# Patient Record
Sex: FEMALE | Race: OTHER | HISPANIC OR LATINO
[De-identification: names, ages, dates, MRNs, and addresses within clinical notes are randomized per-mention and may not be internally consistent; named-entity substitution may affect disease eponyms.]

---

## 2017-04-17 ENCOUNTER — APPOINTMENT (OUTPATIENT)
Dept: NEUROLOGY | Facility: CLINIC | Age: 62
End: 2017-04-17
Payer: COMMERCIAL

## 2017-04-17 VITALS — HEIGHT: 63 IN | BODY MASS INDEX: 32.25 KG/M2 | WEIGHT: 182 LBS

## 2017-04-17 DIAGNOSIS — G45.9 TRANSIENT CEREBRAL ISCHEMIC ATTACK, UNSPECIFIED: ICD-10-CM

## 2017-04-17 DIAGNOSIS — R51 HEADACHE: ICD-10-CM

## 2017-04-17 PROCEDURE — 99205 OFFICE O/P NEW HI 60 MIN: CPT

## 2017-05-17 ENCOUNTER — FORM ENCOUNTER (OUTPATIENT)
Age: 62
End: 2017-05-17

## 2017-05-18 ENCOUNTER — APPOINTMENT (OUTPATIENT)
Dept: MRI IMAGING | Facility: IMAGING CENTER | Age: 62
End: 2017-05-18

## 2017-05-18 ENCOUNTER — OUTPATIENT (OUTPATIENT)
Dept: OUTPATIENT SERVICES | Facility: HOSPITAL | Age: 62
LOS: 1 days | End: 2017-05-18
Payer: MEDICAID

## 2017-05-18 DIAGNOSIS — R51 HEADACHE: ICD-10-CM

## 2017-05-18 DIAGNOSIS — G45.9 TRANSIENT CEREBRAL ISCHEMIC ATTACK, UNSPECIFIED: ICD-10-CM

## 2017-05-18 PROCEDURE — 70544 MR ANGIOGRAPHY HEAD W/O DYE: CPT

## 2017-05-18 PROCEDURE — 82565 ASSAY OF CREATININE: CPT

## 2017-05-18 PROCEDURE — 70553 MRI BRAIN STEM W/O & W/DYE: CPT

## 2018-06-20 ENCOUNTER — APPOINTMENT (OUTPATIENT)
Dept: OTOLARYNGOLOGY | Facility: CLINIC | Age: 63
End: 2018-06-20
Payer: MEDICAID

## 2018-06-20 ENCOUNTER — OUTPATIENT (OUTPATIENT)
Dept: OUTPATIENT SERVICES | Facility: HOSPITAL | Age: 63
LOS: 1 days | Discharge: HOME | End: 2018-06-20

## 2018-06-20 VITALS — HEIGHT: 63.5 IN | BODY MASS INDEX: 31.5 KG/M2 | WEIGHT: 180 LBS

## 2018-06-20 DIAGNOSIS — E07.9 DISORDER OF THYROID, UNSPECIFIED: ICD-10-CM

## 2018-06-20 PROCEDURE — 92511 NASOPHARYNGOSCOPY: CPT

## 2018-06-20 PROCEDURE — 99204 OFFICE O/P NEW MOD 45 MIN: CPT | Mod: 25

## 2018-07-31 DIAGNOSIS — J38.3 OTHER DISEASES OF VOCAL CORDS: ICD-10-CM

## 2018-07-31 DIAGNOSIS — J34.89 OTHER SPECIFIED DISORDERS OF NOSE AND NASAL SINUSES: ICD-10-CM

## 2018-07-31 DIAGNOSIS — K21.9 GASTRO-ESOPHAGEAL REFLUX DISEASE WITHOUT ESOPHAGITIS: ICD-10-CM

## 2018-07-31 DIAGNOSIS — G47.30 SLEEP APNEA, UNSPECIFIED: ICD-10-CM

## 2018-07-31 DIAGNOSIS — J32.9 CHRONIC SINUSITIS, UNSPECIFIED: ICD-10-CM

## 2018-07-31 DIAGNOSIS — J38.4 EDEMA OF LARYNX: ICD-10-CM

## 2018-07-31 DIAGNOSIS — E07.9 DISORDER OF THYROID, UNSPECIFIED: ICD-10-CM

## 2018-09-26 ENCOUNTER — APPOINTMENT (OUTPATIENT)
Dept: OTOLARYNGOLOGY | Facility: CLINIC | Age: 63
End: 2018-09-26
Payer: MEDICAID

## 2018-09-26 DIAGNOSIS — J38.4 EDEMA OF LARYNX: ICD-10-CM

## 2018-09-26 PROCEDURE — 31231 NASAL ENDOSCOPY DX: CPT

## 2018-09-26 PROCEDURE — 99214 OFFICE O/P EST MOD 30 MIN: CPT | Mod: 25

## 2018-09-27 PROBLEM — J38.4 LARYNGEAL EDEMA: Status: ACTIVE | Noted: 2018-07-28

## 2019-02-22 ENCOUNTER — APPOINTMENT (OUTPATIENT)
Dept: OTOLARYNGOLOGY | Facility: HOSPITAL | Age: 64
End: 2019-02-22

## 2019-02-27 ENCOUNTER — APPOINTMENT (OUTPATIENT)
Dept: OTOLARYNGOLOGY | Facility: CLINIC | Age: 64
End: 2019-02-27
Payer: MEDICAID

## 2019-02-27 ENCOUNTER — OUTPATIENT (OUTPATIENT)
Dept: OUTPATIENT SERVICES | Facility: HOSPITAL | Age: 64
LOS: 1 days | Discharge: HOME | End: 2019-02-27

## 2019-02-27 VITALS
WEIGHT: 180 LBS | DIASTOLIC BLOOD PRESSURE: 87 MMHG | HEIGHT: 63.5 IN | BODY MASS INDEX: 31.5 KG/M2 | SYSTOLIC BLOOD PRESSURE: 152 MMHG | HEART RATE: 96 BPM

## 2019-02-27 PROCEDURE — 31231 NASAL ENDOSCOPY DX: CPT

## 2019-02-27 PROCEDURE — 99214 OFFICE O/P EST MOD 30 MIN: CPT | Mod: 25

## 2019-02-27 NOTE — REASON FOR VISIT
[Subsequent Evaluation] : a subsequent evaluation for [Other: _____] : [unfilled] [FreeTextEntry2] : follow up visit for nasal obstruction and LPR. Nasal congestion. \par

## 2019-02-27 NOTE — HISTORY OF PRESENT ILLNESS
[de-identified] : has not yet had PSG\par had bad sinus infection about two weeks ago, given abx and steroid\par does have 3-4 sinus infections each year for many years\par no recent sinus CT\par ears have recovered after infection\par still congested, snoring\par h/o rhinoplasty about 40 years ago, c/o chronic L>R nasal obstruction\par

## 2019-02-27 NOTE — CONSULT LETTER
[Dear  ___] : Dear  [unfilled], [Consult Letter:] : I had the pleasure of evaluating your patient, [unfilled]. [Please see my note below.] : Please see my note below. [Consult Closing:] : Thank you very much for allowing me to participate in the care of this patient.  If you have any questions, please do not hesitate to contact me. [Sincerely,] : Sincerely, [FreeTextEntry3] : Bishop Davis MD, PhD\par Chief, Division of Laryngology\par Department of Otolaryngology\par [facility]\par Pediatric Otolaryngology, [facility]\par  of Otolaryngology\par [facility] [employer] [facility]\par \par \par

## 2019-02-27 NOTE — PHYSICAL EXAM
[Nasal Endoscopy Performed] : nasal endoscopy was performed, see procedure section for findings [] : septum deviated bilaterally [Midline] : trachea located in midline position [Normal] : no rashes [de-identified] : hyponasal voice

## 2019-03-02 ENCOUNTER — OUTPATIENT (OUTPATIENT)
Dept: OUTPATIENT SERVICES | Facility: HOSPITAL | Age: 64
LOS: 1 days | End: 2019-03-02
Payer: MEDICAID

## 2019-03-02 ENCOUNTER — APPOINTMENT (OUTPATIENT)
Dept: CT IMAGING | Facility: IMAGING CENTER | Age: 64
End: 2019-03-02
Payer: MEDICAID

## 2019-03-02 DIAGNOSIS — G47.30 SLEEP APNEA, UNSPECIFIED: ICD-10-CM

## 2019-03-02 PROCEDURE — 70486 CT MAXILLOFACIAL W/O DYE: CPT | Mod: 26

## 2019-03-02 PROCEDURE — 70486 CT MAXILLOFACIAL W/O DYE: CPT

## 2019-03-04 DIAGNOSIS — G47.30 SLEEP APNEA, UNSPECIFIED: ICD-10-CM

## 2019-03-04 DIAGNOSIS — J32.9 CHRONIC SINUSITIS, UNSPECIFIED: ICD-10-CM

## 2019-03-04 DIAGNOSIS — J34.89 OTHER SPECIFIED DISORDERS OF NOSE AND NASAL SINUSES: ICD-10-CM

## 2019-03-04 DIAGNOSIS — J38.3 OTHER DISEASES OF VOCAL CORDS: ICD-10-CM

## 2019-03-04 DIAGNOSIS — K21.9 GASTRO-ESOPHAGEAL REFLUX DISEASE WITHOUT ESOPHAGITIS: ICD-10-CM

## 2019-03-07 ENCOUNTER — OUTPATIENT (OUTPATIENT)
Dept: OUTPATIENT SERVICES | Facility: HOSPITAL | Age: 64
LOS: 1 days | End: 2019-03-07
Payer: MEDICAID

## 2019-03-07 VITALS
WEIGHT: 182.1 LBS | SYSTOLIC BLOOD PRESSURE: 150 MMHG | OXYGEN SATURATION: 98 % | HEART RATE: 90 BPM | DIASTOLIC BLOOD PRESSURE: 96 MMHG | HEIGHT: 61.5 IN | TEMPERATURE: 99 F

## 2019-03-07 DIAGNOSIS — J34.89 OTHER SPECIFIED DISORDERS OF NOSE AND NASAL SINUSES: ICD-10-CM

## 2019-03-07 DIAGNOSIS — E66.9 OBESITY, UNSPECIFIED: ICD-10-CM

## 2019-03-07 DIAGNOSIS — Z90.49 ACQUIRED ABSENCE OF OTHER SPECIFIED PARTS OF DIGESTIVE TRACT: Chronic | ICD-10-CM

## 2019-03-07 DIAGNOSIS — Z86.79 PERSONAL HISTORY OF OTHER DISEASES OF THE CIRCULATORY SYSTEM: ICD-10-CM

## 2019-03-07 DIAGNOSIS — J34.2 DEVIATED NASAL SEPTUM: ICD-10-CM

## 2019-03-07 LAB
ALBUMIN SERPL ELPH-MCNC: 4.2 G/DL — SIGNIFICANT CHANGE UP (ref 3.3–5)
ALP SERPL-CCNC: 115 U/L — SIGNIFICANT CHANGE UP (ref 40–120)
ALT FLD-CCNC: 52 U/L — HIGH (ref 4–33)
ANION GAP SERPL CALC-SCNC: 13 MMO/L — SIGNIFICANT CHANGE UP (ref 7–14)
AST SERPL-CCNC: 27 U/L — SIGNIFICANT CHANGE UP (ref 4–32)
BILIRUB SERPL-MCNC: 0.3 MG/DL — SIGNIFICANT CHANGE UP (ref 0.2–1.2)
BUN SERPL-MCNC: 20 MG/DL — SIGNIFICANT CHANGE UP (ref 7–23)
CALCIUM SERPL-MCNC: 9.2 MG/DL — SIGNIFICANT CHANGE UP (ref 8.4–10.5)
CHLORIDE SERPL-SCNC: 99 MMOL/L — SIGNIFICANT CHANGE UP (ref 98–107)
CO2 SERPL-SCNC: 24 MMOL/L — SIGNIFICANT CHANGE UP (ref 22–31)
CREAT SERPL-MCNC: 0.7 MG/DL — SIGNIFICANT CHANGE UP (ref 0.5–1.3)
GLUCOSE SERPL-MCNC: 88 MG/DL — SIGNIFICANT CHANGE UP (ref 70–99)
HCT VFR BLD CALC: 46.3 % — HIGH (ref 34.5–45)
HGB BLD-MCNC: 15 G/DL — SIGNIFICANT CHANGE UP (ref 11.5–15.5)
MCHC RBC-ENTMCNC: 29.8 PG — SIGNIFICANT CHANGE UP (ref 27–34)
MCHC RBC-ENTMCNC: 32.4 % — SIGNIFICANT CHANGE UP (ref 32–36)
MCV RBC AUTO: 91.9 FL — SIGNIFICANT CHANGE UP (ref 80–100)
NRBC # FLD: 0 K/UL — LOW (ref 25–125)
PLATELET # BLD AUTO: 167 K/UL — SIGNIFICANT CHANGE UP (ref 150–400)
PMV BLD: 11.9 FL — SIGNIFICANT CHANGE UP (ref 7–13)
POTASSIUM SERPL-MCNC: 4.2 MMOL/L — SIGNIFICANT CHANGE UP (ref 3.5–5.3)
POTASSIUM SERPL-SCNC: 4.2 MMOL/L — SIGNIFICANT CHANGE UP (ref 3.5–5.3)
PROT SERPL-MCNC: 7.7 G/DL — SIGNIFICANT CHANGE UP (ref 6–8.3)
RBC # BLD: 5.04 M/UL — SIGNIFICANT CHANGE UP (ref 3.8–5.2)
RBC # FLD: 13.2 % — SIGNIFICANT CHANGE UP (ref 10.3–14.5)
SODIUM SERPL-SCNC: 136 MMOL/L — SIGNIFICANT CHANGE UP (ref 135–145)
WBC # BLD: 6.81 K/UL — SIGNIFICANT CHANGE UP (ref 3.8–10.5)
WBC # FLD AUTO: 6.81 K/UL — SIGNIFICANT CHANGE UP (ref 3.8–10.5)

## 2019-03-07 PROCEDURE — 93010 ELECTROCARDIOGRAM REPORT: CPT

## 2019-03-07 RX ORDER — SODIUM CHLORIDE 9 MG/ML
1000 INJECTION, SOLUTION INTRAVENOUS
Qty: 0 | Refills: 0 | Status: DISCONTINUED | OUTPATIENT
Start: 2019-03-18 | End: 2019-04-02

## 2019-03-07 RX ORDER — SODIUM CHLORIDE 9 MG/ML
3 INJECTION INTRAMUSCULAR; INTRAVENOUS; SUBCUTANEOUS EVERY 8 HOURS
Qty: 0 | Refills: 0 | Status: DISCONTINUED | OUTPATIENT
Start: 2019-03-18 | End: 2019-04-02

## 2019-03-07 NOTE — H&P PST ADULT - PSH
H/O abdominoplasty    H/O breast augmentation    S/P appendectomy  11/2015  S/P partial hysterectomy

## 2019-03-07 NOTE — H&P PST ADULT - NEGATIVE ENMT SYMPTOMS
no hearing difficulty/no nasal congestion/no nasal obstruction/no nose bleeds/no tinnitus/no sinus symptoms/no ear pain/no post-nasal discharge/no throat pain/no dysphagia/no abnormal taste sensation

## 2019-03-07 NOTE — H&P PST ADULT - PROBLEM SELECTOR PLAN 1
Scheduled for Adenoidectomy, septoplasty, bilateral inferior turbinate resection, nasal endoscopy, possible repair vestibular stenosis on 03/18/19. Pre op instructions, famotidine given and explained. Pt verbalized understanding.

## 2019-03-07 NOTE — H&P PST ADULT - NEGATIVE NEUROLOGICAL SYMPTOMS
no loss of sensation/no headache/no facial palsy/no difficulty walking/no paresthesias/no generalized seizures no focal seizures/no hemiparesis/no facial palsy/no paresthesias/no headache/no weakness/no generalized seizures/no loss of sensation/no difficulty walking/no tremors/no vertigo

## 2019-03-07 NOTE — H&P PST ADULT - PMH
Anxiety    Appendicitis    Cervical disc herniation    Dyslipidemia    GERD (gastroesophageal reflux disease)    H/O: HTN (hypertension)    History of transient ischemic attack    Hypothyroid    Lumbar disc disorder    Obesity (BMI 30-39.9) Anxiety    Appendicitis    Cervical disc herniation    Deviated nasal septum    Dyslipidemia    GERD (gastroesophageal reflux disease)    H/O: HTN (hypertension)    History of transient ischemic attack    Hypertrophy of nasal turbinates    Hypothyroid    Lumbar disc disorder    Obesity (BMI 30-39.9)

## 2019-03-07 NOTE — H&P PST ADULT - HISTORY OF PRESENT ILLNESS
61 yr old female with PMH: Hypothyroidism, HTN, TIA (2009), Dyslipidemia, Obesity, Anxiety presents to PST for pre op evaluation with long term h/o recurrent frequent sinus infections. C/O difficulty breathing through the nose x 7 years. 61 yr old female with PMH: Hypothyroidism, HTN, TIA (2009), Dyslipidemia, Obesity, Anxiety presents to PST for pre op evaluation with long term h/o recurrent frequent sinus infections. C/O difficulty breathing through the nose x 7 years. Now scheduled for Adenoidectomy, Septoplasty, bilateral inferior turbinate resection, nasal endoscopy, possible repair on vestibular stenosis on 03/18/19 61 yr old female with PMH: Hypothyroidism, HTN, Hypothyroidism, TIA (2009), Dyslipidemia, Obesity, Anxiety presents to PST for pre op evaluation with long term h/o recurrent frequent sinus infections. C/O difficulty breathing through the nose x 7 years. Now scheduled for Adenoidectomy, Septoplasty, bilateral inferior turbinate resection, nasal endoscopy, possible repair on vestibular stenosis on 03/18/19

## 2019-03-07 NOTE — H&P PST ADULT - NEGATIVE GENERAL GENITOURINARY SYMPTOMS
no flank pain L/no hematuria/no renal colic/no dysuria/no flank pain R/no bladder infections/no urine discoloration/no gas in urine/normal urinary frequency

## 2019-03-07 NOTE — H&P PST ADULT - NSANTHOSAYNRD_GEN_A_CORE
No. FIOR screening performed.  STOP BANG Legend: 0-2 = LOW Risk; 3-4 = INTERMEDIATE Risk; 5-8 = HIGH Risk

## 2019-03-07 NOTE — H&P PST ADULT - NEGATIVE GENERAL SYMPTOMS
no malaise/no fever/no chills/no polydipsia/no weight loss/no weight gain/no polyphagia/no polyuria/no fatigue

## 2019-03-07 NOTE — H&P PST ADULT - NEGATIVE BREAST SYMPTOMS
no breast tenderness L/no breast tenderness R/no nipple discharge L/no breast lump L/no nipple discharge R/no breast lump R

## 2019-03-07 NOTE — H&P PST ADULT - RS GEN PE MLT RESP DETAILS PC
no intercostal retractions/no rhonchi/respirations non-labored/no wheezes/clear to auscultation bilaterally/airway patent/good air movement/no subcutaneous emphysema/breath sounds equal/no rales/no chest wall tenderness

## 2019-03-07 NOTE — H&P PST ADULT - NEGATIVE OPHTHALMOLOGIC SYMPTOMS
no blurred vision L/no pain R/no irritation R/no loss of vision L/no blurred vision R/no discharge R/no pain L/no irritation L/no loss of vision R/no diplopia/no photophobia/no lacrimation R/no discharge L/no lacrimation L

## 2019-03-07 NOTE — H&P PST ADULT - PROBLEM SELECTOR PLAN 3
/96- 150/86 during PST. Pt asymptomatic.  Pt instructed to see PCP for further BP evaluation preop  Pending medical consultation

## 2019-03-15 NOTE — ASU PATIENT PROFILE, ADULT - PMH
Anxiety    Appendicitis    Cervical disc herniation    Deviated nasal septum    Dyslipidemia    GERD (gastroesophageal reflux disease)    H/O: HTN (hypertension)    History of transient ischemic attack    Hypertrophy of nasal turbinates    Hypothyroid    Lumbar disc disorder    Obesity (BMI 30-39.9)

## 2019-03-17 ENCOUNTER — TRANSCRIPTION ENCOUNTER (OUTPATIENT)
Age: 64
End: 2019-03-17

## 2019-03-18 ENCOUNTER — OUTPATIENT (OUTPATIENT)
Dept: OUTPATIENT SERVICES | Facility: HOSPITAL | Age: 64
LOS: 1 days | Discharge: HOME | End: 2019-03-18
Payer: MEDICAID

## 2019-03-18 ENCOUNTER — RESULT REVIEW (OUTPATIENT)
Age: 64
End: 2019-03-18

## 2019-03-18 ENCOUNTER — APPOINTMENT (OUTPATIENT)
Dept: OTOLARYNGOLOGY | Facility: HOSPITAL | Age: 64
End: 2019-03-18

## 2019-03-18 VITALS
OXYGEN SATURATION: 96 % | SYSTOLIC BLOOD PRESSURE: 123 MMHG | HEART RATE: 88 BPM | DIASTOLIC BLOOD PRESSURE: 63 MMHG | RESPIRATION RATE: 15 BRPM

## 2019-03-18 VITALS
RESPIRATION RATE: 16 BRPM | HEART RATE: 93 BPM | SYSTOLIC BLOOD PRESSURE: 165 MMHG | OXYGEN SATURATION: 94 % | HEIGHT: 61.5 IN | WEIGHT: 182.1 LBS | DIASTOLIC BLOOD PRESSURE: 80 MMHG | TEMPERATURE: 98 F

## 2019-03-18 DIAGNOSIS — Z90.49 ACQUIRED ABSENCE OF OTHER SPECIFIED PARTS OF DIGESTIVE TRACT: Chronic | ICD-10-CM

## 2019-03-18 DIAGNOSIS — J34.89 OTHER SPECIFIED DISORDERS OF NOSE AND NASAL SINUSES: ICD-10-CM

## 2019-03-18 PROCEDURE — 88311 DECALCIFY TISSUE: CPT | Mod: 26

## 2019-03-18 PROCEDURE — 30465 REPAIR NASAL STENOSIS: CPT | Mod: LT

## 2019-03-18 PROCEDURE — 30140 RESECT INFERIOR TURBINATE: CPT | Mod: 50

## 2019-03-18 PROCEDURE — 31254 NSL/SINS NDSC W/PRTL ETHMDCT: CPT | Mod: LT

## 2019-03-18 PROCEDURE — 31256 EXPLORATION MAXILLARY SINUS: CPT | Mod: 50

## 2019-03-18 PROCEDURE — 31253 NSL/SINS NDSC TOTAL: CPT | Mod: RT

## 2019-03-18 PROCEDURE — 30520 REPAIR OF NASAL SEPTUM: CPT

## 2019-03-18 PROCEDURE — 88304 TISSUE EXAM BY PATHOLOGIST: CPT | Mod: 26

## 2019-03-18 PROCEDURE — 42831 REMOVAL OF ADENOIDS: CPT

## 2019-03-18 RX ORDER — ONDANSETRON 8 MG/1
4 TABLET, FILM COATED ORAL ONCE
Qty: 0 | Refills: 0 | Status: COMPLETED | OUTPATIENT
Start: 2019-03-18 | End: 2019-03-18

## 2019-03-18 RX ORDER — SODIUM CHLORIDE 9 MG/ML
1000 INJECTION, SOLUTION INTRAVENOUS ONCE
Qty: 0 | Refills: 0 | Status: COMPLETED | OUTPATIENT
Start: 2019-03-18 | End: 2019-03-18

## 2019-03-18 RX ORDER — OXYCODONE AND ACETAMINOPHEN 5; 325 MG/1; MG/1
1 TABLET ORAL EVERY 4 HOURS
Qty: 0 | Refills: 0 | Status: DISCONTINUED | OUTPATIENT
Start: 2019-03-18 | End: 2019-03-18

## 2019-03-18 RX ORDER — FENTANYL CITRATE 50 UG/ML
25 INJECTION INTRAVENOUS
Qty: 0 | Refills: 0 | Status: DISCONTINUED | OUTPATIENT
Start: 2019-03-18 | End: 2019-03-18

## 2019-03-18 RX ORDER — HYDROMORPHONE HYDROCHLORIDE 2 MG/ML
0.5 INJECTION INTRAMUSCULAR; INTRAVENOUS; SUBCUTANEOUS
Qty: 0 | Refills: 0 | Status: DISCONTINUED | OUTPATIENT
Start: 2019-03-18 | End: 2019-03-18

## 2019-03-18 RX ORDER — ASPIRIN/CALCIUM CARB/MAGNESIUM 324 MG
2 TABLET ORAL
Qty: 0 | Refills: 0 | COMMUNITY

## 2019-03-18 RX ADMIN — SODIUM CHLORIDE 3 MILLILITER(S): 9 INJECTION INTRAMUSCULAR; INTRAVENOUS; SUBCUTANEOUS at 14:03

## 2019-03-18 RX ADMIN — HYDROMORPHONE HYDROCHLORIDE 0.5 MILLIGRAM(S): 2 INJECTION INTRAMUSCULAR; INTRAVENOUS; SUBCUTANEOUS at 14:00

## 2019-03-18 RX ADMIN — SODIUM CHLORIDE 2000 MILLILITER(S): 9 INJECTION, SOLUTION INTRAVENOUS at 15:45

## 2019-03-18 RX ADMIN — ONDANSETRON 4 MILLIGRAM(S): 8 TABLET, FILM COATED ORAL at 13:35

## 2019-03-18 RX ADMIN — SODIUM CHLORIDE 30 MILLILITER(S): 9 INJECTION, SOLUTION INTRAVENOUS at 14:16

## 2019-03-18 RX ADMIN — HYDROMORPHONE HYDROCHLORIDE 0.5 MILLIGRAM(S): 2 INJECTION INTRAMUSCULAR; INTRAVENOUS; SUBCUTANEOUS at 14:15

## 2019-03-18 NOTE — ASU DISCHARGE PLAN (ADULT/PEDIATRIC) - ASU DC SPECIAL INSTRUCTIONSFT
Nasal saline to each nostril 2-3 times a day. Nasal saline irrigation 2 times a day morning and evening. Afrin only if bleeding occurs for no more than 2-3 days. Pain medicine as needed. Augmentin x 10 days.

## 2019-03-18 NOTE — ASU DISCHARGE PLAN (ADULT/PEDIATRIC) - CARE PROVIDER_API CALL
Bishop Davis (MD; PhD)  Otolaryngology  [facility]  Dept of Otolaryngology, [address] [place] [address]  Phone: [phone]  Fax: [phone]  Follow Up Time:

## 2019-03-18 NOTE — ASU DISCHARGE PLAN (ADULT/PEDIATRIC) - CALL YOUR DOCTOR IF YOU HAVE ANY OF THE FOLLOWING:
Fever greater than (need to indicate Fahrenheit or Celsius)/Bleeding that does not stop EOMI Bleeding that does not stop/Fever greater than (need to indicate Fahrenheit or Celsius)/Nausea and vomiting that does not stop

## 2019-03-18 NOTE — ASU PREOP CHECKLIST - COMMENTS
Pt took Pepcid with sip water at  AM Pt took Pepcid /atorvastatin/ metoprolol/ synthroid / amlodipine  paroxetine   /with sip water at  AM

## 2019-03-19 PROBLEM — J34.3 HYPERTROPHY OF NASAL TURBINATES: Chronic | Status: ACTIVE | Noted: 2019-03-07

## 2019-03-19 PROBLEM — J34.2 DEVIATED NASAL SEPTUM: Chronic | Status: ACTIVE | Noted: 2019-03-07

## 2019-03-28 ENCOUNTER — APPOINTMENT (OUTPATIENT)
Dept: OTOLARYNGOLOGY | Facility: CLINIC | Age: 64
End: 2019-03-28
Payer: MEDICAID

## 2019-03-28 ENCOUNTER — APPOINTMENT (OUTPATIENT)
Dept: OTOLARYNGOLOGY | Facility: CLINIC | Age: 64
End: 2019-03-28

## 2019-03-28 PROCEDURE — 99024 POSTOP FOLLOW-UP VISIT: CPT

## 2019-04-03 ENCOUNTER — APPOINTMENT (OUTPATIENT)
Dept: OTOLARYNGOLOGY | Facility: CLINIC | Age: 64
End: 2019-04-03
Payer: MEDICAID

## 2019-04-03 PROCEDURE — 99024 POSTOP FOLLOW-UP VISIT: CPT

## 2019-04-03 PROCEDURE — 31237 NSL/SINS NDSC SURG BX POLYPC: CPT | Mod: 50,58

## 2019-05-19 NOTE — HISTORY OF PRESENT ILLNESS
[de-identified] : doing well following ESS, minimal bleeding, no vision change\par breathing through the nose has greatly improved, feels breathing is symmetric\par no clear drainage, no HA, sense of smell is returning, pain has subsided, still mildly lethargic\par sleeping well\par using saline rinses\par will be travelling\par

## 2019-05-19 NOTE — HISTORY OF PRESENT ILLNESS
[de-identified] : minimal bleeding since surgery, has used some saline, can breathe through nose but not clear with splints in place\par no vision changes, pain has subsided mostly\par

## 2019-05-29 ENCOUNTER — APPOINTMENT (OUTPATIENT)
Dept: OTOLARYNGOLOGY | Facility: CLINIC | Age: 64
End: 2019-05-29
Payer: MEDICAID

## 2019-05-29 PROCEDURE — 99214 OFFICE O/P EST MOD 30 MIN: CPT | Mod: 25

## 2019-05-29 PROCEDURE — 31237 NSL/SINS NDSC SURG BX POLYPC: CPT | Mod: 50,58

## 2019-05-29 NOTE — HISTORY OF PRESENT ILLNESS
[de-identified] : 64yo F here for f/u surgery stenosis repair, BITR and ESS. Doing really well. Breathing has significantly improved. Has had some left sided soreness. No sinus pain or pressure since surgery. No sinus infections. Has been occasionally doing NS Irrigations. Patent bilaterally. Can pinpoint one spot on left side where feels pain with palpation.\par

## 2019-05-29 NOTE — REASON FOR VISIT
[Subsequent Evaluation] : a subsequent evaluation for [FreeTextEntry2] : f/u BITR and ESS 03/18/2019

## 2019-05-30 DIAGNOSIS — J33.8 OTHER POLYP OF SINUS: ICD-10-CM

## 2019-05-30 DIAGNOSIS — J32.0 CHRONIC MAXILLARY SINUSITIS: ICD-10-CM

## 2019-07-17 ENCOUNTER — OUTPATIENT (OUTPATIENT)
Dept: OUTPATIENT SERVICES | Facility: HOSPITAL | Age: 64
LOS: 1 days | Discharge: HOME | End: 2019-07-17

## 2019-07-17 ENCOUNTER — APPOINTMENT (OUTPATIENT)
Dept: OTOLARYNGOLOGY | Facility: CLINIC | Age: 64
End: 2019-07-17
Payer: MEDICAID

## 2019-07-17 VITALS
HEART RATE: 112 BPM | DIASTOLIC BLOOD PRESSURE: 91 MMHG | SYSTOLIC BLOOD PRESSURE: 145 MMHG | BODY MASS INDEX: 31.5 KG/M2 | WEIGHT: 180 LBS | HEIGHT: 63.5 IN

## 2019-07-17 DIAGNOSIS — J38.3 OTHER DISEASES OF VOCAL CORDS: ICD-10-CM

## 2019-07-17 DIAGNOSIS — Z90.49 ACQUIRED ABSENCE OF OTHER SPECIFIED PARTS OF DIGESTIVE TRACT: Chronic | ICD-10-CM

## 2019-07-17 DIAGNOSIS — J33.8 OTHER POLYP OF SINUS: ICD-10-CM

## 2019-07-17 DIAGNOSIS — J32.9 CHRONIC RHINITIS: ICD-10-CM

## 2019-07-17 DIAGNOSIS — J31.0 CHRONIC RHINITIS: ICD-10-CM

## 2019-07-17 DIAGNOSIS — G47.30 SLEEP APNEA, UNSPECIFIED: ICD-10-CM

## 2019-07-17 PROCEDURE — 99214 OFFICE O/P EST MOD 30 MIN: CPT | Mod: 25

## 2019-07-17 PROCEDURE — 31579 LARYNGOSCOPY TELESCOPIC: CPT

## 2019-07-17 RX ORDER — RANITIDINE HYDROCHLORIDE 150 MG/1
150 CAPSULE ORAL
Qty: 90 | Refills: 0 | Status: DISCONTINUED | COMMUNITY
Start: 2018-09-26 | End: 2019-07-17

## 2019-07-17 RX ORDER — OMEPRAZOLE 20 MG/1
20 CAPSULE, DELAYED RELEASE ORAL TWICE DAILY
Qty: 60 | Refills: 0 | Status: DISCONTINUED | COMMUNITY
Start: 2018-06-20 | End: 2019-07-17

## 2019-07-17 RX ORDER — OXYCODONE AND ACETAMINOPHEN 5; 325 MG/1; MG/1
5-325 TABLET ORAL
Qty: 20 | Refills: 0 | Status: DISCONTINUED | COMMUNITY
Start: 2019-03-18 | End: 2019-07-17

## 2019-07-17 RX ORDER — PREDNISONE 10 MG/1
10 TABLET ORAL
Qty: 21 | Refills: 0 | Status: DISCONTINUED | COMMUNITY
Start: 2017-04-17 | End: 2019-07-17

## 2019-07-17 NOTE — REASON FOR VISIT
[Subsequent Evaluation] : a subsequent evaluation for [Family Member] : family member [FreeTextEntry2] : follow up for BITR and ESS 03/18/2019

## 2019-07-17 NOTE — PHYSICAL EXAM
[Nasal Endoscopy Performed] : nasal endoscopy was performed, see procedure section for findings [Midline] : trachea located in midline position [Laryngoscopy Performed] : laryngoscopy was performed, see procedure section for findings [Normal] : no rashes [de-identified] : clear voice

## 2019-07-17 NOTE — HISTORY OF PRESENT ILLNESS
[de-identified] : 64 year old female follow up for BITR and ESS 03/18/2019. Breathing has greatly improved. States intermittent nasal congestion,treated with Flonase nasal spray with relief.  Denies sinus pain/pressure, post nasal drip, nasal discharge. Very pleased with nasal breathing. No recent infections. No further obstruciton. Dripping has resolved. No further left sided pain (granulkation tissue removed). Sleep is ok, still snoring. No reflux meds. Some heartburn. Some globus sensation. Diet exacerbates reflux.\par \par \par

## 2019-09-25 ENCOUNTER — APPOINTMENT (OUTPATIENT)
Dept: MRI IMAGING | Facility: IMAGING CENTER | Age: 64
End: 2019-09-25
Payer: MEDICAID

## 2019-09-25 ENCOUNTER — OUTPATIENT (OUTPATIENT)
Dept: OUTPATIENT SERVICES | Facility: HOSPITAL | Age: 64
LOS: 1 days | End: 2019-09-25
Payer: MEDICAID

## 2019-09-25 DIAGNOSIS — Z90.49 ACQUIRED ABSENCE OF OTHER SPECIFIED PARTS OF DIGESTIVE TRACT: Chronic | ICD-10-CM

## 2019-09-25 DIAGNOSIS — Z00.8 ENCOUNTER FOR OTHER GENERAL EXAMINATION: ICD-10-CM

## 2019-09-25 PROCEDURE — 70551 MRI BRAIN STEM W/O DYE: CPT | Mod: 26

## 2019-09-25 PROCEDURE — 70551 MRI BRAIN STEM W/O DYE: CPT

## 2019-10-03 DIAGNOSIS — J32.9 CHRONIC SINUSITIS, UNSPECIFIED: ICD-10-CM

## 2019-10-03 DIAGNOSIS — K21.9 GASTRO-ESOPHAGEAL REFLUX DISEASE WITHOUT ESOPHAGITIS: ICD-10-CM

## 2019-10-03 DIAGNOSIS — J33.8 OTHER POLYP OF SINUS: ICD-10-CM

## 2019-10-03 DIAGNOSIS — J38.3 OTHER DISEASES OF VOCAL CORDS: ICD-10-CM

## 2019-10-03 DIAGNOSIS — J34.89 OTHER SPECIFIED DISORDERS OF NOSE AND NASAL SINUSES: ICD-10-CM

## 2019-10-03 DIAGNOSIS — G47.30 SLEEP APNEA, UNSPECIFIED: ICD-10-CM

## 2019-10-03 DIAGNOSIS — J32.0 CHRONIC MAXILLARY SINUSITIS: ICD-10-CM

## 2019-11-14 ENCOUNTER — APPOINTMENT (OUTPATIENT)
Dept: OTOLARYNGOLOGY | Facility: CLINIC | Age: 64
End: 2019-11-14
Payer: MEDICAID

## 2019-11-14 VITALS — SYSTOLIC BLOOD PRESSURE: 162 MMHG | DIASTOLIC BLOOD PRESSURE: 100 MMHG | HEART RATE: 112 BPM

## 2019-11-14 PROCEDURE — 31231 NASAL ENDOSCOPY DX: CPT

## 2019-11-14 PROCEDURE — 99213 OFFICE O/P EST LOW 20 MIN: CPT | Mod: 25

## 2019-11-14 NOTE — HISTORY OF PRESENT ILLNESS
[de-identified] : 64 year old female follow up for BITR and ESS 03/18/2019. Breathing has greatly improved. States intermittent nasal congestion,treated with Flonase nasal spray with relief.  Denies sinus pain/pressure, post nasal drip, nasal discharge. Very pleased with nasal breathing. No recent infections. No further obstruciton. Minimal snoring. Reflux improved with diet. Higher BP when in doctor's office.,\par \par \par \par

## 2019-11-25 ENCOUNTER — APPOINTMENT (OUTPATIENT)
Dept: DERMATOLOGY | Facility: CLINIC | Age: 64
End: 2019-11-25

## 2019-12-12 ENCOUNTER — APPOINTMENT (OUTPATIENT)
Dept: PLASTIC SURGERY | Facility: CLINIC | Age: 64
End: 2019-12-12
Payer: COMMERCIAL

## 2019-12-12 VITALS — HEIGHT: 63 IN | WEIGHT: 180 LBS | BODY MASS INDEX: 31.89 KG/M2

## 2019-12-12 DIAGNOSIS — Z41.1 ENCOUNTER FOR COSMETIC SURGERY: ICD-10-CM

## 2019-12-12 RX ORDER — VALSARTAN 320 MG/1
320 TABLET, COATED ORAL
Qty: 90 | Refills: 0 | Status: ACTIVE | COMMUNITY
Start: 2019-10-25

## 2019-12-12 RX ORDER — METOPROLOL SUCCINATE 50 MG/1
50 TABLET, EXTENDED RELEASE ORAL
Qty: 90 | Refills: 0 | Status: ACTIVE | COMMUNITY
Start: 2019-08-27

## 2019-12-12 RX ORDER — ATORVASTATIN CALCIUM 20 MG/1
20 TABLET, FILM COATED ORAL
Qty: 30 | Refills: 0 | Status: ACTIVE | COMMUNITY
Start: 2019-11-05

## 2019-12-12 NOTE — HISTORY OF PRESENT ILLNESS
[FreeTextEntry1] : Patient presents to the office for a Botox consult.\ruddy Pt states her last injection was 4 months ago and denies any reaction.\ruddy Pt is here to discuss w/MD.

## 2020-03-10 NOTE — H&P PST ADULT - NSANTHSNORERD_ENT_A_CORE
Have You Had Dermabrasion Before?: has had previous dermabrasion
When Outside In The Sun, Do You...: mostly burns, rarely tans
Yes

## 2020-06-30 ENCOUNTER — LABORATORY RESULT (OUTPATIENT)
Age: 65
End: 2020-06-30

## 2020-06-30 ENCOUNTER — APPOINTMENT (OUTPATIENT)
Dept: DERMATOLOGY | Facility: CLINIC | Age: 65
End: 2020-06-30
Payer: MEDICAID

## 2020-06-30 VITALS — HEIGHT: 63 IN | BODY MASS INDEX: 31.89 KG/M2 | WEIGHT: 180 LBS

## 2020-06-30 VITALS — TEMPERATURE: 97.8 F

## 2020-06-30 DIAGNOSIS — L82.1 OTHER SEBORRHEIC KERATOSIS: ICD-10-CM

## 2020-06-30 DIAGNOSIS — D22.9 MELANOCYTIC NEVI, UNSPECIFIED: ICD-10-CM

## 2020-06-30 DIAGNOSIS — Z12.83 ENCOUNTER FOR SCREENING FOR MALIGNANT NEOPLASM OF SKIN: ICD-10-CM

## 2020-06-30 DIAGNOSIS — D48.5 NEOPLASM OF UNCERTAIN BEHAVIOR OF SKIN: ICD-10-CM

## 2020-06-30 PROCEDURE — 99203 OFFICE O/P NEW LOW 30 MIN: CPT | Mod: 25

## 2020-06-30 PROCEDURE — 11102 TANGNTL BX SKIN SINGLE LES: CPT

## 2020-07-08 ENCOUNTER — EMERGENCY (EMERGENCY)
Facility: HOSPITAL | Age: 65
LOS: 1 days | Discharge: HOME | End: 2020-07-08
Attending: EMERGENCY MEDICINE | Admitting: EMERGENCY MEDICINE
Payer: MEDICAID

## 2020-07-08 VITALS
TEMPERATURE: 99 F | HEART RATE: 94 BPM | SYSTOLIC BLOOD PRESSURE: 161 MMHG | OXYGEN SATURATION: 97 % | RESPIRATION RATE: 18 BRPM | DIASTOLIC BLOOD PRESSURE: 89 MMHG

## 2020-07-08 DIAGNOSIS — Z90.49 ACQUIRED ABSENCE OF OTHER SPECIFIED PARTS OF DIGESTIVE TRACT: Chronic | ICD-10-CM

## 2020-07-08 PROCEDURE — 99282 EMERGENCY DEPT VISIT SF MDM: CPT

## 2020-07-08 NOTE — ED PROVIDER NOTE - NSFOLLOWUPINSTRUCTIONS_ED_ALL_ED_FT
Follow up with Dermatologist tomorrow as planned.  Return to ER immediately for fever, worsening infection, or further concern.

## 2020-07-08 NOTE — ED PROVIDER NOTE - PATIENT PORTAL LINK FT
You can access the FollowMyHealth Patient Portal offered by [facility] by registering at the following website: [email or web address]. By joining [employer]’s FollowMyHealth portal, you will also be able to view your health information using other applications (apps) compatible with our system.

## 2020-07-08 NOTE — ED PROVIDER NOTE - CLINICAL SUMMARY MEDICAL DECISION MAKING FREE TEXT BOX
63 y/o with area of cellulitis on left thigh post derm biopsy.  Keflex started today.  Would not consider keflex failure as just begun.  Recommended continued use of keflex as prescribed.  If area of infection increases in size after 24 hours, consider changing abx.  Discussed with patient.  Has appt with derm tomorrow for re-assessment.

## 2020-07-08 NOTE — ED PROVIDER NOTE - OBJECTIVE STATEMENT
65 y/o F s/p biopsy of skin lesion by dermatologist Dr. Kim on 6/30/20.  Yesterday noticed site on left anterior thigh with "infection".  To UC approximately noon today and prescribed cephalexin and silvadene topical.  She reports took 3 cephalexin 500mg since this afternoon.  To ED as concerned that antibiotic was not working since she does not see improvement.  No fever.  No other areas of infection. Has not taken any medicine for pain. Has appt with dermatologist tomorrow.  Meds - levothyroxine, lipitor, valsartan, metoprolol, amlodipine.

## 2020-07-08 NOTE — ED PROVIDER NOTE - PHYSICAL EXAMINATION
ATTENDING PHYSICAL EXAM  GEN - NAD; well appearing; A+O x3  HEAD - NC/AT; EYES anicteric  NECK: Neck supple  PULMONARY - CTA b/l, symmetric breath sounds  CARDIAC -s1s2, RRR, no M,R,G  ABDOMEN - +BS, ND, NT, soft  EXTREMITIES - Anteriolateral left thigh with area of infection.  6cm diameter area of erythema/warmth c/w cellulitis, with 1cm diameter area of ulceration in middle.  No pus drainage.  Mild swelling.  No fluctuence.  Area circumscribed with surgical marking pen. ATTENDING PHYSICAL EXAM  GEN - NAD; well appearing; A+O x3  HEAD - NC/AT; EYES anicteric  NECK: Neck supple  PULMONARY - CTA b/l, symmetric breath sounds  CARDIAC -s1s2, RRR, no M,R,G  ABDOMEN - +BS, ND, NT, soft  EXTREMITIES - Anteriolateral left thigh with area of infection.  6cm diameter area of erythema/warmth c/w cellulitis, with 1cm diameter area of ulceration in middle.  No pus drainage.  Mild swelling.  No fluctuance.  Area circumscribed with surgical marking pen.

## 2020-07-08 NOTE — ED ADULT TRIAGE NOTE - CHIEF COMPLAINT QUOTE
Pt c/o infection. Reports had skin biopsy to left thigh on 6/30. Reports noticed redness to site went to urgent care given keflex with no relief. Appears in NAD. Hx HLD, HTN, hypothyroidism.

## 2020-07-08 NOTE — ED PROVIDER NOTE - TOBACCO USE
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 Pt was admitted to the ICU and placed on cardene gtt and home medication resumed. Head CT was unremarkable for any acute abnormality. Pt started on Keppra and she as quickly weaned off cardene gtt indicating non-compliance with medication likely cause. Cardiology, Nephrology and Neurology consulted. Cardiology checked ECHO and may want further ischemic workup. IF no further workup needed by Cards and no more seizures with BP adequately controlled, can possibly discharge tomorrow. F/u on Cards, Neurology and Nephrology recs. She will need f/u with transplant and get liver biopsy as outpatient.    MARK Maharaj MD   Never smoker

## 2020-07-09 ENCOUNTER — APPOINTMENT (OUTPATIENT)
Dept: DERMATOLOGY | Facility: CLINIC | Age: 65
End: 2020-07-09
Payer: MEDICAID

## 2020-07-09 DIAGNOSIS — B99.9 UNSPECIFIED INFECTIOUS DISEASE: ICD-10-CM

## 2020-07-09 DIAGNOSIS — L08.9 LOCAL INFECTION OF THE SKIN AND SUBCUTANEOUS TISSUE, UNSPECIFIED: ICD-10-CM

## 2020-07-09 PROCEDURE — 99213 OFFICE O/P EST LOW 20 MIN: CPT | Mod: 95

## 2020-07-13 ENCOUNTER — LABORATORY RESULT (OUTPATIENT)
Age: 65
End: 2020-07-13

## 2020-07-13 ENCOUNTER — APPOINTMENT (OUTPATIENT)
Dept: DERMATOLOGY | Facility: CLINIC | Age: 65
End: 2020-07-13
Payer: MEDICAID

## 2020-07-13 VITALS — TEMPERATURE: 98 F

## 2020-07-13 PROCEDURE — 99213 OFFICE O/P EST LOW 20 MIN: CPT

## 2020-07-23 ENCOUNTER — OUTPATIENT (OUTPATIENT)
Dept: OUTPATIENT SERVICES | Facility: HOSPITAL | Age: 65
LOS: 1 days | Discharge: HOME | End: 2020-07-23

## 2020-07-23 ENCOUNTER — APPOINTMENT (OUTPATIENT)
Dept: OTOLARYNGOLOGY | Facility: CLINIC | Age: 65
End: 2020-07-23
Payer: MEDICAID

## 2020-07-23 VITALS
DIASTOLIC BLOOD PRESSURE: 85 MMHG | SYSTOLIC BLOOD PRESSURE: 141 MMHG | BODY MASS INDEX: 31.89 KG/M2 | HEART RATE: 91 BPM | WEIGHT: 180 LBS | TEMPERATURE: 98.3 F | HEIGHT: 63 IN

## 2020-07-23 DIAGNOSIS — Z90.49 ACQUIRED ABSENCE OF OTHER SPECIFIED PARTS OF DIGESTIVE TRACT: Chronic | ICD-10-CM

## 2020-07-23 DIAGNOSIS — J32.0 CHRONIC MAXILLARY SINUSITIS: ICD-10-CM

## 2020-07-23 PROCEDURE — 31231 NASAL ENDOSCOPY DX: CPT

## 2020-07-23 PROCEDURE — 99214 OFFICE O/P EST MOD 30 MIN: CPT | Mod: 25

## 2020-07-23 RX ORDER — MULTIVITAMIN
TABLET ORAL
Refills: 0 | Status: ACTIVE | COMMUNITY

## 2020-07-23 RX ORDER — AMOXICILLIN AND CLAVULANATE POTASSIUM 875; 125 MG/1; MG/1
875-125 TABLET, COATED ORAL
Qty: 20 | Refills: 0 | Status: DISCONTINUED | COMMUNITY
Start: 2019-09-11 | End: 2020-07-23

## 2020-07-23 RX ORDER — ESTRADIOL 10 UG/1
10 TABLET VAGINAL
Qty: 16 | Refills: 0 | Status: DISCONTINUED | COMMUNITY
Start: 2019-11-22 | End: 2020-07-23

## 2020-07-23 NOTE — HISTORY OF PRESENT ILLNESS
[de-identified] : 65 year old female followup BITR and ESS 03/18/2019. Breathing is good. Denies nasal congestion. Reports occasional nasal dryness, uses Flonase with relief Denies sinus pain/pressure, post nasal drip. Reports one sinus infection about 4 months treated with antibiotics. Reports mild snoring. Breathing through the nose well. Has been exercising at home a lot more  and DINORA has improved.  is doing well. Kids are well.\par

## 2020-07-23 NOTE — REASON FOR VISIT
[Subsequent Evaluation] : a subsequent evaluation for [Other: _____] : [unfilled] [FreeTextEntry2] : HUNG and RICHA 03/18/2019

## 2020-07-23 NOTE — PHYSICAL EXAM
[Nasal Endoscopy Performed] : nasal endoscopy was performed, see procedure section for findings [Midline] : trachea located in midline position [Normal] : no rashes [de-identified] : clear voice

## 2020-07-23 NOTE — CONSULT LETTER
[Dear  ___] : Dear  [unfilled], [Please see my note below.] : Please see my note below. [Consult Letter:] : I had the pleasure of evaluating your patient, [unfilled]. [Sincerely,] : Sincerely, [Consult Closing:] : Thank you very much for allowing me to participate in the care of this patient.  If you have any questions, please do not hesitate to contact me. [FreeTextEntry3] : Bishop Davis MD, PhD\par Chief, Division of Laryngology\par Department of Otolaryngology\par [facility]\par Pediatric Otolaryngology, [facility]\par  of Otolaryngology\par [facility] [employer] [facility]\par \par  [FreeTextEntry2] : Dr Cas Edwards

## 2020-08-04 DIAGNOSIS — J32.0 CHRONIC MAXILLARY SINUSITIS: ICD-10-CM

## 2020-08-04 DIAGNOSIS — G47.30 SLEEP APNEA, UNSPECIFIED: ICD-10-CM

## 2020-08-04 DIAGNOSIS — J33.8 OTHER POLYP OF SINUS: ICD-10-CM

## 2020-08-04 DIAGNOSIS — J34.89 OTHER SPECIFIED DISORDERS OF NOSE AND NASAL SINUSES: ICD-10-CM

## 2020-09-14 ENCOUNTER — APPOINTMENT (OUTPATIENT)
Dept: DERMATOLOGY | Facility: CLINIC | Age: 65
End: 2020-09-14
Payer: MEDICAID

## 2020-09-14 VITALS — WEIGHT: 178 LBS | BODY MASS INDEX: 32.76 KG/M2 | HEIGHT: 62 IN

## 2020-09-14 VITALS — TEMPERATURE: 97.3 F

## 2020-09-14 DIAGNOSIS — R20.2 PARESTHESIA OF SKIN: ICD-10-CM

## 2020-09-14 DIAGNOSIS — L03.119 CELLULITIS OF UNSPECIFIED PART OF LIMB: ICD-10-CM

## 2020-09-14 PROCEDURE — 99213 OFFICE O/P EST LOW 20 MIN: CPT

## 2020-12-14 ENCOUNTER — APPOINTMENT (OUTPATIENT)
Dept: DERMATOLOGY | Facility: CLINIC | Age: 65
End: 2020-12-14

## 2020-12-16 ENCOUNTER — APPOINTMENT (OUTPATIENT)
Dept: OTOLARYNGOLOGY | Facility: CLINIC | Age: 65
End: 2020-12-16

## 2021-04-01 ENCOUNTER — OUTPATIENT (OUTPATIENT)
Dept: OUTPATIENT SERVICES | Facility: HOSPITAL | Age: 66
LOS: 1 days | Discharge: HOME | End: 2021-04-01

## 2021-04-01 ENCOUNTER — APPOINTMENT (OUTPATIENT)
Dept: OTOLARYNGOLOGY | Facility: CLINIC | Age: 66
End: 2021-04-01
Payer: MEDICAID

## 2021-04-01 VITALS — HEIGHT: 63 IN | BODY MASS INDEX: 31.89 KG/M2 | WEIGHT: 180 LBS

## 2021-04-01 DIAGNOSIS — Z90.49 ACQUIRED ABSENCE OF OTHER SPECIFIED PARTS OF DIGESTIVE TRACT: Chronic | ICD-10-CM

## 2021-04-01 PROCEDURE — 99214 OFFICE O/P EST MOD 30 MIN: CPT | Mod: 25

## 2021-04-01 PROCEDURE — 99072 ADDL SUPL MATRL&STAF TM PHE: CPT

## 2021-04-01 PROCEDURE — 31231 NASAL ENDOSCOPY DX: CPT

## 2021-04-01 NOTE — HISTORY OF PRESENT ILLNESS
[de-identified] : 65 year old female followup BITR and ESS 03/18/2019. Breathing has improved. Denies nasal congestion. Reports occasional nasal dryness, uses Flonase and Ayr gel with relief. Reports intermittent post nasal drip. Denies sinus pain/pressure. Denies sinus infections since last visit. Reports snoring, no pausing, gasping, choking. Breathing through the nose well. Less heartburn since changing diet. Off meds.\par

## 2021-04-01 NOTE — PHYSICAL EXAM
[Nasal Endoscopy Performed] : nasal endoscopy was performed, see procedure section for findings [Midline] : trachea located in midline position [Normal] : no rashes [de-identified] : clear voice

## 2021-04-01 NOTE — REASON FOR VISIT
[Subsequent Evaluation] : a subsequent evaluation for [FreeTextEntry2] : HUNG and RICHA 03/18/2019.

## 2021-04-01 NOTE — CONSULT LETTER
[Dear  ___] : Dear  [unfilled], [Consult Letter:] : I had the pleasure of evaluating your patient, [unfilled]. [Please see my note below.] : Please see my note below. [Consult Closing:] : Thank you very much for allowing me to participate in the care of this patient.  If you have any questions, please do not hesitate to contact me. [Sincerely,] : Sincerely, [FreeTextEntry3] : Bishop Davis MD, PhD\par Chief, Division of Laryngology\par Department of Otolaryngology\par [facility]\par Pediatric Otolaryngology, [facility]\par  of Otolaryngology\par [facility] [employer] [facility] [employer]\par

## 2021-04-08 ENCOUNTER — RX RENEWAL (OUTPATIENT)
Age: 66
End: 2021-04-08

## 2021-05-02 NOTE — H&P PST ADULT - LIVES WITH, PROFILE
Implemented All Fall Risk Interventions:  [place] to call system. Call bell, personal items and telephone within reach. Instruct patient to call for assistance. Room bathroom lighting operational. Non-slip footwear when patient is off stretcher. Physically safe environment: no spills, clutter or unnecessary equipment. Stretcher in lowest position, wheels locked, appropriate side rails in place. Provide visual cue, wrist band, yellow gown, etc. Monitor gait and stability. Monitor for mental status changes and reorient to person, place, and time. Review medications for side effects contributing to fall risk. Reinforce activity limits and safety measures with patient and family.
spouse

## 2021-05-06 DIAGNOSIS — K21.9 GASTRO-ESOPHAGEAL REFLUX DISEASE WITHOUT ESOPHAGITIS: ICD-10-CM

## 2021-05-06 DIAGNOSIS — J32.9 CHRONIC SINUSITIS, UNSPECIFIED: ICD-10-CM

## 2021-05-06 DIAGNOSIS — J34.89 OTHER SPECIFIED DISORDERS OF NOSE AND NASAL SINUSES: ICD-10-CM

## 2021-05-06 DIAGNOSIS — J38.3 OTHER DISEASES OF VOCAL CORDS: ICD-10-CM

## 2021-05-06 DIAGNOSIS — E07.9 DISORDER OF THYROID, UNSPECIFIED: ICD-10-CM

## 2021-07-17 ENCOUNTER — INPATIENT (INPATIENT)
Facility: HOSPITAL | Age: 66
LOS: 1 days | Discharge: HOME | End: 2021-07-19
Attending: INTERNAL MEDICINE | Admitting: INTERNAL MEDICINE
Payer: MEDICARE

## 2021-07-17 VITALS
HEART RATE: 101 BPM | SYSTOLIC BLOOD PRESSURE: 158 MMHG | TEMPERATURE: 98 F | HEIGHT: 61.5 IN | DIASTOLIC BLOOD PRESSURE: 95 MMHG | RESPIRATION RATE: 16 BRPM | OXYGEN SATURATION: 100 %

## 2021-07-17 DIAGNOSIS — Z90.49 ACQUIRED ABSENCE OF OTHER SPECIFIED PARTS OF DIGESTIVE TRACT: Chronic | ICD-10-CM

## 2021-07-17 LAB
ALBUMIN SERPL ELPH-MCNC: 4.3 G/DL — SIGNIFICANT CHANGE UP (ref 3.3–5)
ALP SERPL-CCNC: 135 U/L — HIGH (ref 40–120)
ALT FLD-CCNC: 51 U/L — HIGH (ref 4–33)
ANION GAP SERPL CALC-SCNC: 12 MMOL/L — SIGNIFICANT CHANGE UP (ref 7–14)
APTT BLD: 33.4 SEC — SIGNIFICANT CHANGE UP (ref 27–36.3)
AST SERPL-CCNC: 33 U/L — HIGH (ref 4–32)
BASOPHILS # BLD AUTO: 0.02 K/UL — SIGNIFICANT CHANGE UP (ref 0–0.2)
BASOPHILS NFR BLD AUTO: 0.4 % — SIGNIFICANT CHANGE UP (ref 0–2)
BILIRUB SERPL-MCNC: 0.2 MG/DL — SIGNIFICANT CHANGE UP (ref 0.2–1.2)
BUN SERPL-MCNC: 14 MG/DL — SIGNIFICANT CHANGE UP (ref 7–23)
CALCIUM SERPL-MCNC: 9.8 MG/DL — SIGNIFICANT CHANGE UP (ref 8.4–10.5)
CHLORIDE SERPL-SCNC: 103 MMOL/L — SIGNIFICANT CHANGE UP (ref 98–107)
CO2 SERPL-SCNC: 25 MMOL/L — SIGNIFICANT CHANGE UP (ref 22–31)
CREAT SERPL-MCNC: 0.82 MG/DL — SIGNIFICANT CHANGE UP (ref 0.5–1.3)
EOSINOPHIL # BLD AUTO: 0.15 K/UL — SIGNIFICANT CHANGE UP (ref 0–0.5)
EOSINOPHIL NFR BLD AUTO: 2.8 % — SIGNIFICANT CHANGE UP (ref 0–6)
GLUCOSE SERPL-MCNC: 104 MG/DL — HIGH (ref 70–99)
HCT VFR BLD CALC: 41.5 % — SIGNIFICANT CHANGE UP (ref 34.5–45)
HGB BLD-MCNC: 13.8 G/DL — SIGNIFICANT CHANGE UP (ref 11.5–15.5)
IANC: 3.06 K/UL — SIGNIFICANT CHANGE UP (ref 1.5–8.5)
IMM GRANULOCYTES NFR BLD AUTO: 0.2 % — SIGNIFICANT CHANGE UP (ref 0–1.5)
INR BLD: 0.94 RATIO — SIGNIFICANT CHANGE UP (ref 0.88–1.16)
LYMPHOCYTES # BLD AUTO: 1.54 K/UL — SIGNIFICANT CHANGE UP (ref 1–3.3)
LYMPHOCYTES # BLD AUTO: 28.6 % — SIGNIFICANT CHANGE UP (ref 13–44)
MCHC RBC-ENTMCNC: 30 PG — SIGNIFICANT CHANGE UP (ref 27–34)
MCHC RBC-ENTMCNC: 33.3 GM/DL — SIGNIFICANT CHANGE UP (ref 32–36)
MCV RBC AUTO: 90.2 FL — SIGNIFICANT CHANGE UP (ref 80–100)
MONOCYTES # BLD AUTO: 0.6 K/UL — SIGNIFICANT CHANGE UP (ref 0–0.9)
MONOCYTES NFR BLD AUTO: 11.2 % — SIGNIFICANT CHANGE UP (ref 2–14)
NEUTROPHILS # BLD AUTO: 3.06 K/UL — SIGNIFICANT CHANGE UP (ref 1.8–7.4)
NEUTROPHILS NFR BLD AUTO: 56.8 % — SIGNIFICANT CHANGE UP (ref 43–77)
NRBC # BLD: 0 /100 WBCS — SIGNIFICANT CHANGE UP
NRBC # FLD: 0 K/UL — SIGNIFICANT CHANGE UP
PLATELET # BLD AUTO: 166 K/UL — SIGNIFICANT CHANGE UP (ref 150–400)
POTASSIUM SERPL-MCNC: 4.4 MMOL/L — SIGNIFICANT CHANGE UP (ref 3.5–5.3)
POTASSIUM SERPL-SCNC: 4.4 MMOL/L — SIGNIFICANT CHANGE UP (ref 3.5–5.3)
PROT SERPL-MCNC: 7.9 G/DL — SIGNIFICANT CHANGE UP (ref 6–8.3)
PROTHROM AB SERPL-ACNC: 10.7 SEC — SIGNIFICANT CHANGE UP (ref 10.6–13.6)
RBC # BLD: 4.6 M/UL — SIGNIFICANT CHANGE UP (ref 3.8–5.2)
RBC # FLD: 13 % — SIGNIFICANT CHANGE UP (ref 10.3–14.5)
SODIUM SERPL-SCNC: 140 MMOL/L — SIGNIFICANT CHANGE UP (ref 135–145)
TSH SERPL-MCNC: 3.01 UIU/ML — SIGNIFICANT CHANGE UP (ref 0.27–4.2)
WBC # BLD: 5.38 K/UL — SIGNIFICANT CHANGE UP (ref 3.8–10.5)
WBC # FLD AUTO: 5.38 K/UL — SIGNIFICANT CHANGE UP (ref 3.8–10.5)

## 2021-07-17 PROCEDURE — 93971 EXTREMITY STUDY: CPT | Mod: 26,LT

## 2021-07-17 RX ORDER — ATORVASTATIN CALCIUM 80 MG/1
1 TABLET, FILM COATED ORAL
Qty: 0 | Refills: 0 | DISCHARGE

## 2021-07-17 RX ORDER — LEVOTHYROXINE SODIUM 125 MCG
1 TABLET ORAL
Qty: 0 | Refills: 0 | DISCHARGE

## 2021-07-17 RX ORDER — AMLODIPINE BESYLATE 2.5 MG/1
1 TABLET ORAL
Qty: 0 | Refills: 0 | DISCHARGE

## 2021-07-17 RX ORDER — LOSARTAN POTASSIUM 100 MG/1
1 TABLET, FILM COATED ORAL
Qty: 0 | Refills: 0 | DISCHARGE

## 2021-07-17 RX ORDER — METOPROLOL TARTRATE 50 MG
50 TABLET ORAL
Qty: 0 | Refills: 0 | DISCHARGE

## 2021-07-17 RX ORDER — ACETAMINOPHEN 500 MG
650 TABLET ORAL ONCE
Refills: 0 | Status: COMPLETED | OUTPATIENT
Start: 2021-07-17 | End: 2021-07-17

## 2021-07-17 RX ADMIN — Medication 650 MILLIGRAM(S): at 21:50

## 2021-07-17 RX ADMIN — Medication 650 MILLIGRAM(S): at 22:50

## 2021-07-17 NOTE — ED ADULT NURSE NOTE - CHIEF COMPLAINT QUOTE
dizziness, atraumatic pain and lower leg swelling to both legs with worse on L leg x 2 days, pain worse with walking, reports recent removal of implant (dental procedure) last week - HX TIA, HTN

## 2021-07-17 NOTE — ED ADULT NURSE NOTE - OBJECTIVE STATEMENT
pt arrives w/ c/o right lower leg pain x 3 days. pt states recently had dental procedure where she had implant removed. pt states today was the worst where she needed assistance with getting around. pt denies any chest pain, nausea, vomiting, fever, cough, chills. IV accessed 20 gauge LAC labs drawn and sent. waiting further orders will continue to monitor.

## 2021-07-17 NOTE — ED PROVIDER NOTE - PHYSICAL EXAMINATION
PHYSICAL EXAM:  GENERAL: NAD, lying in bed comfortably, WDWN  HEAD:  Atraumatic, Normocephalic  EYES: EOMI, PERRLA, conjunctiva and sclera clear  ENT: MMM, no erythema/pallor/petechiae  NECK: Supple, No JVD  LUNG: CTA b/l; no r/r/w/r. Unlabored respirations  HEART: RRR, +S1/S2; No m/r/g  ABDOMEN: soft, NT/ND, no rebound or guarding; BS x 4  EXTREMITIES: 1+ nonpitting edema in LE b/l; +TTP proximal L calf w/o erythema; 2+ Peripheral Pulses, brisk cap refill. No clubbing or cyanosis  NERVOUS SYSTEM: AAOx3, speech clear. No deficits, sensation intact throughout  MSK: FROM all 4 extremities, full and equal strength in UE b/l; 4/5 in LLE, 5/5 strength in RLE  SKIN: No rashes or lesions

## 2021-07-17 NOTE — ED PROVIDER NOTE - OBJECTIVE STATEMENT
Pt is a 66yoF w/complex MHx incl. HTN, GERD, gastroparesis, TIA x2 (2007 & 2008), hypothyroid on synthroid, p/w 3d of b/l leg swelling. Pt has 3d of increasing b/l leg swelling L>R, associated with some mild nausea, dizziness ("feeling wobbly/off-balance"), and constipation. Pain has increased from initial 6/10 to 10/10 prompting pt to come to ED today. Movement makes it worse; rest makes it better. Pt recently had colonoscopy 10d ago, as well as hardware removed from her R jaw during an outpt procedure w/local anesthetic last week. Pt was given a course of amoxicillin which she has completed. She denies HA, SOB, CP, abd pain, hematuria, hematochezia, urgency/frequency/dysuria, v/d, f/c, sick contacts, trauma/mechanical injury.

## 2021-07-17 NOTE — ED PROVIDER NOTE - PROGRESS NOTE DETAILS
Repeat EKG showing T wave inversion in V2, upright in V3; dynamic changes compared to previous. - Shruthi Gutierrez, PGY-1 Pt signed out to me, Dr. Mart. Pt Has non-specific T-wave changes to EKG in precordial leads. Will obtain repeat EKG and troponin x2. TBA tele vs cdu for stress testing and ACS rule out. US read pending for LE edema. Repeat EKG showing T wave inversion in V2, upright in V3, changed compared to previous. Will observe in CDU, stress test and ECHO in the AM. - Shruthi Gutierrez, PGY-1

## 2021-07-17 NOTE — ED ADULT TRIAGE NOTE - CHIEF COMPLAINT QUOTE
dizziness, atraumatic pain and lower leg swelling to both legs with worse on L leg x 2 days, pain worse with walking - HX TIA, HTN dizziness, atraumatic pain and lower leg swelling to both legs with worse on L leg x 2 days, pain worse with walking, reports recent dental procedure - HX TIA, HTN dizziness, atraumatic pain and lower leg swelling to both legs with worse on L leg x 2 days, pain worse with walking, reports recent removal of implant (dental procedure) last week - HX TIA, HTN

## 2021-07-17 NOTE — ED PROVIDER NOTE - ATTENDING CONTRIBUTION TO CARE
lorraine 67 yo woman who presents with leg swelling bilateral, hx low thyroid, tia x2, also with leg pain.  tender left calf, mild edema, will us and check labs/ ecg    I performed a history and physical exam of the patient and discussed their management with the resident and /or advanced care provider. I reviewed the resident and /or ACP's note and agree with the documented findings and plan of care. My medical decison making and observations are found above.

## 2021-07-17 NOTE — ED PROVIDER NOTE - CLINICAL SUMMARY MEDICAL DECISION MAKING FREE TEXT BOX
66yoF w/complex Hx incl. HTN, hypothyroidism, TIAx2 p/w bilateral leg swelling for 3d w/associated mild nausea + dizziness. No CP or SOB. VSS, phys exam significant for mild b/l LE edema and L proximal calf tenderness w/o warmth/erythema. Low suspicion for DVT, will eval for HF vs. less likely liver/kidney/thyroid etiology. Order CBC, CMP, coags, TSH, DVT US for LLE and tylenol for pain and reassess. - Shruthi Gutierrez, PGY-1 66yoF w/complex Hx incl. HTN, hypothyroidism, TIAx2 p/w bilateral leg swelling for 3d w/associated mild nausea + dizziness. No CP or SOB. VSS, phys exam significant for mild b/l LE edema and L proximal calf tenderness w/o warmth/erythema. Low suspicion for DVT, will eval for HF vs. less likely liver/kidney/thyroid etiology. Order CBC, CMP, coags, TSH, DVT US for LLE and tylenol for pain and reassess. - Shruthi Gutierrez, PGY-1    [facility] 67 [facility] who presents with leg swelling bilateral, hx low thyroid, tia x2, also with leg pain.  tender left calf, mild edema, will us and check labs/ ecg

## 2021-07-18 DIAGNOSIS — E03.9 HYPOTHYROIDISM, UNSPECIFIED: ICD-10-CM

## 2021-07-18 DIAGNOSIS — I10 ESSENTIAL (PRIMARY) HYPERTENSION: ICD-10-CM

## 2021-07-18 DIAGNOSIS — M79.89 OTHER SPECIFIED SOFT TISSUE DISORDERS: ICD-10-CM

## 2021-07-18 DIAGNOSIS — F41.9 ANXIETY DISORDER, UNSPECIFIED: ICD-10-CM

## 2021-07-18 DIAGNOSIS — Z29.9 ENCOUNTER FOR PROPHYLACTIC MEASURES, UNSPECIFIED: ICD-10-CM

## 2021-07-18 DIAGNOSIS — I25.10 ATHEROSCLEROTIC HEART DISEASE OF NATIVE CORONARY ARTERY WITHOUT ANGINA PECTORIS: ICD-10-CM

## 2021-07-18 DIAGNOSIS — R94.39 ABNORMAL RESULT OF OTHER CARDIOVASCULAR FUNCTION STUDY: ICD-10-CM

## 2021-07-18 LAB
B PERT DNA SPEC QL NAA+PROBE: SIGNIFICANT CHANGE UP
C PNEUM DNA SPEC QL NAA+PROBE: SIGNIFICANT CHANGE UP
FLUAV SUBTYP SPEC NAA+PROBE: SIGNIFICANT CHANGE UP
FLUBV RNA SPEC QL NAA+PROBE: SIGNIFICANT CHANGE UP
HADV DNA SPEC QL NAA+PROBE: SIGNIFICANT CHANGE UP
HCOV 229E RNA SPEC QL NAA+PROBE: SIGNIFICANT CHANGE UP
HCOV HKU1 RNA SPEC QL NAA+PROBE: SIGNIFICANT CHANGE UP
HCOV NL63 RNA SPEC QL NAA+PROBE: SIGNIFICANT CHANGE UP
HCOV OC43 RNA SPEC QL NAA+PROBE: SIGNIFICANT CHANGE UP
HMPV RNA SPEC QL NAA+PROBE: SIGNIFICANT CHANGE UP
HPIV1 RNA SPEC QL NAA+PROBE: SIGNIFICANT CHANGE UP
HPIV2 RNA SPEC QL NAA+PROBE: SIGNIFICANT CHANGE UP
HPIV3 RNA SPEC QL NAA+PROBE: SIGNIFICANT CHANGE UP
HPIV4 RNA SPEC QL NAA+PROBE: SIGNIFICANT CHANGE UP
RAPID RVP RESULT: SIGNIFICANT CHANGE UP
RSV RNA SPEC QL NAA+PROBE: SIGNIFICANT CHANGE UP
RV+EV RNA SPEC QL NAA+PROBE: SIGNIFICANT CHANGE UP
SARS-COV-2 RNA SPEC QL NAA+PROBE: SIGNIFICANT CHANGE UP
TROPONIN T, HIGH SENSITIVITY RESULT: 9 NG/L — SIGNIFICANT CHANGE UP

## 2021-07-18 PROCEDURE — 93306 TTE W/DOPPLER COMPLETE: CPT | Mod: 26

## 2021-07-18 PROCEDURE — 78452 HT MUSCLE IMAGE SPECT MULT: CPT | Mod: 26

## 2021-07-18 PROCEDURE — 99223 1ST HOSP IP/OBS HIGH 75: CPT

## 2021-07-18 PROCEDURE — 93016 CV STRESS TEST SUPVJ ONLY: CPT | Mod: GC

## 2021-07-18 PROCEDURE — 93018 CV STRESS TEST I&R ONLY: CPT | Mod: GC

## 2021-07-18 RX ORDER — VALSARTAN 80 MG/1
1 TABLET ORAL
Qty: 0 | Refills: 0 | DISCHARGE

## 2021-07-18 RX ORDER — ACETAMINOPHEN 500 MG
650 TABLET ORAL EVERY 6 HOURS
Refills: 0 | Status: DISCONTINUED | OUTPATIENT
Start: 2021-07-18 | End: 2021-07-19

## 2021-07-18 RX ORDER — METOPROLOL TARTRATE 50 MG
50 TABLET ORAL DAILY
Refills: 0 | Status: DISCONTINUED | OUTPATIENT
Start: 2021-07-18 | End: 2021-07-18

## 2021-07-18 RX ORDER — LEVOTHYROXINE SODIUM 125 MCG
50 TABLET ORAL DAILY
Refills: 0 | Status: DISCONTINUED | OUTPATIENT
Start: 2021-07-18 | End: 2021-07-19

## 2021-07-18 RX ORDER — METOPROLOL TARTRATE 50 MG
50 TABLET ORAL DAILY
Refills: 0 | Status: DISCONTINUED | OUTPATIENT
Start: 2021-07-18 | End: 2021-07-19

## 2021-07-18 RX ORDER — ASPIRIN/CALCIUM CARB/MAGNESIUM 324 MG
81 TABLET ORAL DAILY
Refills: 0 | Status: DISCONTINUED | OUTPATIENT
Start: 2021-07-18 | End: 2021-07-19

## 2021-07-18 RX ORDER — ATORVASTATIN CALCIUM 80 MG/1
10 TABLET, FILM COATED ORAL AT BEDTIME
Refills: 0 | Status: DISCONTINUED | OUTPATIENT
Start: 2021-07-18 | End: 2021-07-19

## 2021-07-18 RX ORDER — AMLODIPINE BESYLATE 2.5 MG/1
5 TABLET ORAL DAILY
Refills: 0 | Status: DISCONTINUED | OUTPATIENT
Start: 2021-07-18 | End: 2021-07-19

## 2021-07-18 RX ORDER — VALSARTAN 80 MG/1
320 TABLET ORAL DAILY
Refills: 0 | Status: DISCONTINUED | OUTPATIENT
Start: 2021-07-18 | End: 2021-07-19

## 2021-07-18 RX ORDER — ASPIRIN/CALCIUM CARB/MAGNESIUM 324 MG
1 TABLET ORAL
Qty: 0 | Refills: 0 | DISCHARGE

## 2021-07-18 RX ADMIN — ATORVASTATIN CALCIUM 10 MILLIGRAM(S): 80 TABLET, FILM COATED ORAL at 22:42

## 2021-07-18 RX ADMIN — Medication 50 MICROGRAM(S): at 04:44

## 2021-07-18 RX ADMIN — AMLODIPINE BESYLATE 5 MILLIGRAM(S): 2.5 TABLET ORAL at 04:43

## 2021-07-18 RX ADMIN — Medication 650 MILLIGRAM(S): at 04:44

## 2021-07-18 RX ADMIN — Medication 50 MILLIGRAM(S): at 04:43

## 2021-07-18 RX ADMIN — Medication 650 MILLIGRAM(S): at 05:45

## 2021-07-18 RX ADMIN — VALSARTAN 320 MILLIGRAM(S): 80 TABLET ORAL at 04:43

## 2021-07-18 NOTE — ED CDU PROVIDER INITIAL DAY NOTE - ATTENDING CONTRIBUTION TO CARE
67 yo F with a PMhx HTN, HLD, Hypothyroidism presented to the ED complaining of having bilateral leg swelling over the past several days, with associated mild SOB worse with exertion. Patient currently does not have chest pain, however EKG changes in main ED concerning for underlying ACS. No recent cardiac evaluation. Tni negative x2 in main ED. US unremarkable for blood clot. Pt admitted to CDU for further cardiac evaluation.

## 2021-07-18 NOTE — ED ADULT NURSE REASSESSMENT NOTE - NS ED NURSE REASSESS COMMENT FT1
break RN: pt resting comfortably. denies any symptoms at this time. NSR on monitor. awaiting results in no distress.

## 2021-07-18 NOTE — ED CDU PROVIDER INITIAL DAY NOTE - MEDICAL DECISION MAKING DETAILS
this is a 66 y,o female with a PMhx HTN, HLD, Hypothyroidism presented to the ED complaining of having bilateral leg swelling over the past several days, leg swelling/ekg changes-tropx2, echo, stress, tele, tele doc

## 2021-07-18 NOTE — CONSULT NOTE ADULT - SUBJECTIVE AND OBJECTIVE BOX
CARDIOLOGY CONSULT NOTE - DR. BELTRE    HPI:    65 y/o female with a PMhx HTN, HLD, Hypothyroidism presented to the ED complaining of having bilateral leg swelling over the past several days, ewith abnl ecg  dyspnea +  Patient denies any chest pain, palpitations, cough, syncope, edema, exertional symptoms, nausea, abdominal pain, fever, chills,  or rash.  Denies any history of CAD, MI, valve disease, cardiomyopathy, or congenital heart disease.        PAST MEDICAL & SURGICAL HISTORY:  H/O: HTN (hypertension)    Dyslipidemia    History of transient ischemic attack    Cervical disc herniation    Appendicitis    Lumbar disc disorder    Hypothyroid    Anxiety    GERD (gastroesophageal reflux disease)    Obesity (BMI 30-39.9)    Deviated nasal septum    Hypertrophy of nasal turbinates    S/P partial hysterectomy    H/O breast augmentation    H/O abdominoplasty    S/P appendectomy  11/2015          PREVIOUS DIAGNOSTIC TESTING:    [ ] Echocardiogram:  [ ]  Catheterization:  [ ] Stress Test:  	    MEDICATIONS:    Home Medications:  amLODIPine 5 mg oral tablet: 1 tab(s) orally once a day AM (17 Jul 2021 21:57)  aspirin 81 mg oral delayed release tablet: 1 tab(s) orally once a day (17 Jul 2021 21:57)  atorvastatin 20 mg oral tablet: 1 tab(s) orally once a day AM (17 Jul 2021 21:57)  culturelle: 1 tab(s) orally once a day AM (17 Jul 2021 21:57)  levothyroxine 50 mcg (0.05 mg) oral tablet: 1 tab(s) orally once a day AM (17 Jul 2021 21:57)  metoprolol: 50 milligram(s) orally once a day AM (17 Jul 2021 21:57)  PARoxetine 10 mg oral tablet: 1 tab(s) orally once a day AM (17 Jul 2021 21:57)  valsartan 320 mg oral tablet: 1 tab(s) orally once a day (17 Jul 2021 21:57)      MEDICATIONS  (STANDING):  amLODIPine   Tablet 5 milliGRAM(s) Oral daily  atorvastatin 10 milliGRAM(s) Oral at bedtime  levothyroxine 50 MICROGram(s) Oral daily  metoprolol succinate ER 50 milliGRAM(s) Oral daily  valsartan 320 milliGRAM(s) Oral daily      FAMILY HISTORY:  No pertinent family history in first degree relatives        SOCIAL HISTORY:    [ ] Non-smoker  [ ] Smoker  [ ] Alcohol    Allergies    Aggrenox (Headache)  Plavix (Rash)  warfarin (Eye Irritation)    Intolerances    	    REVIEW OF SYSTEMS:  CONSTITUTIONAL: No fever, weight loss, or fatigue  EYES: No eye pain, visual disturbances, or discharge  ENMT:  No difficulty hearing, tinnitus, vertigo; No sinus or throat pain  NECK: No pain or stiffness  RESPIRATORY: No cough, wheezing, chills or hemoptysis; ++ Shortness of Breath  CARDIOVASCULAR: as HPI  GASTROINTESTINAL: No abdominal or epigastric pain. No nausea, vomiting, or hematemesis; No diarrhea or constipation. No melena or hematochezia.  GENITOURINARY: No dysuria, frequency, hematuria, or incontinence  NEUROLOGICAL: No headaches, memory loss, loss of strength, numbness, or tremors  SKIN: No itching, burning, rashes, or lesions   	  [ ] All others negative	  [ ] Unable to obtain    PHYSICAL EXAM:    T(C): 37.1 (07-18-21 @ 07:01), Max: 37.1 (07-18-21 @ 07:01)  HR: 68 (07-18-21 @ 07:01) (58 - 101)  BP: 145/82 (07-18-21 @ 07:01) (116/76 - 158/95)  RR: 18 (07-18-21 @ 07:01) (16 - 18)  SpO2: 100% (07-18-21 @ 07:01) (96% - 100%)  Wt(kg): --  I&O's Summary    Daily Height in cm: 156.21 (17 Jul 2021 20:15)    Daily     patient in stress lab  unable to examine    Appearance: Normal	      TELEMETRY: 	    ECG:  	sr, t wave abnl right precordial leads  RADIOLOGY:  OTHER: 	  	  LABS:	 	    CARDIAC MARKERS:        proBNP: Serum Pro-Brain Natriuretic Peptide: 19 pg/mL (07-18 @ 00:52)      Lipid Profile:   HgA1c:   TSH: Thyroid Stimulating Hormone, Serum: 3.01 uIU/mL (07-17-21 @ 22:24)                            13.8   5.38  )-----------( 166      ( 17 Jul 2021 22:24 )             41.5     07-17    140  |  103  |  14  ----------------------------<  104<H>  4.4   |  25  |  0.82    Ca    9.8      17 Jul 2021 22:24    TPro  7.9  /  Alb  4.3  /  TBili  0.2  /  DBili  x   /  AST  33<H>  /  ALT  51<H>  /  AlkPhos  135<H>  07-17    PT/INR - ( 17 Jul 2021 22:24 )   PT: 10.7 sec;   INR: 0.94 ratio         PTT - ( 17 Jul 2021 22:24 )  PTT:33.4 sec    Creatinine, Serum: 0.82 mg/dL (07-17-21 @ 22:24)        ASSESSMENT/PLAN:

## 2021-07-18 NOTE — ED CDU PROVIDER INITIAL DAY NOTE - OBJECTIVE STATEMENT
this is a 66 y,o female with a PMhx HTN, HLD, Hypothyroidism presented to the ED complaining of having bilateral leg swelling over the past several days, Pt normally eats salt free however lately patient has been eating salt, believed that could be what is causing the leg swelling. Pt also states that she has been having mild SOB, states that when she goes for long walks she has noticed SOB. Patient currently does not have chest pain, admits to haivng mild discomfort substernally which she has had for years and is associated with GERD. patient had a echo and stress done almost 20 years ago. Patient denies having any nausea, vomiting, diarrhea, abdominal pain, TAI, headaches, or dizziness. In the ED patient was found to have Twave inversion in lead V2 and V3 which concerned team thus placement in CDU.

## 2021-07-18 NOTE — H&P ADULT - NSICDXPASTMEDICALHX_GEN_ALL_CORE_FT
PAST MEDICAL HISTORY:  Anxiety     Appendicitis     Cervical disc herniation     Deviated nasal septum     Dyslipidemia     GERD (gastroesophageal reflux disease)     H/O: HTN (hypertension)     History of transient ischemic attack     Hypertrophy of nasal turbinates     Hypothyroid     Lumbar disc disorder     Obesity (BMI 30-39.9)

## 2021-07-18 NOTE — ED CDU PROVIDER INITIAL DAY NOTE - NS_OBSORDERDATE_ED_A_ED
Wound Diameter In Cm(Optional): 0 Detail Level: Detailed Add 03271 Cpt? (Important Note: In 2017 The Use Of 43934 Is Being Tracked By Cms To Determine Future Global Period Reimbursement For Global Periods): no 18-Jul-2021 04:39

## 2021-07-18 NOTE — H&P ADULT - NSICDXFAMILYHX_GEN_ALL_CORE_FT
FAMILY HISTORY:  Father  Still living? Yes, Estimated age: Age Unknown  FH: CAD (coronary artery disease), Age at diagnosis: Age Unknown

## 2021-07-18 NOTE — ED CDU PROVIDER INITIAL DAY NOTE - PROGRESS NOTE DETAILS
Pt reassessed this morning, no complaints of cp or sob. States the LE edema has somewhat improved. No events on tele. Echo wnl. Stress test consistent with small, mild defects in  basal and mid lateral  walls that are fixed, suggestive of infarct. Spoke w/ Dr. Mathews who saw the pt earlier, states pt can be offered admission for non emergent cath. Discussed results with pt, agrees with plan for admission. All questions answered.

## 2021-07-18 NOTE — ED CDU PROVIDER DISPOSITION NOTE - ATTENDING CONTRIBUTION TO CARE
I performed a face to face evaluation of this patient and obtained a history and performed a full exam.  I agree with the history, physical exam and plan of the PA.  66 y,o female with a PMhx HTN, HLD, Hypothyroidism presented to the ED complaining of having bilateral leg swelling over the past several days, Pt also states that she has been having mild SOB, states that when she goes for long walks she has noticed SOB. In the ED patient was found to have Twave inversion in lead V2 and V3, so pt placed in the cdu for stress/echo/tele doc eval. Pt reassessed this morning, no complaints of cp or sob. States the LE edema has somewhat improved. No events on tele. Echo wnl. Stress test consistent with small, mild defects in  basal and mid lateral  walls that are fixed, suggestive of infarct. Spoke w/ Dr. Mathews who saw the pt earlier, states pt can be offered admission for non emergent cath. Discussed results with pt, agrees with plan for admission. All questions answered.  Pt with minimal swelling currently- endorsed had improved, lungs cta and heart s1, s2 no m,r,g.

## 2021-07-18 NOTE — H&P ADULT - ASSESSMENT
66yoF w/ h/o HTN, GERD, gastroparesis, TIA x 2 (2007 & 2008), hypothyroid on synthroid, p/w 3 days of b/l leg swelling and pain, also w/ dyspnea. Pt w/ stress test showing infarct.

## 2021-07-18 NOTE — ED CDU PROVIDER DISPOSITION NOTE - CLINICAL COURSE
66 y,o female with a PMhx HTN, HLD, Hypothyroidism presented to the ED complaining of having bilateral leg swelling over the past several days, Pt also states that she has been having mild SOB, states that when she goes for long walks she has noticed SOB. In the ED patient was found to have Twave inversion in lead V2 and V3, so pt placed in the cdu for stress/echo/tele doc eval. Pt reassessed this morning, no complaints of cp or sob. States the LE edema has somewhat improved. No events on tele. Echo wnl. Stress test consistent with small, mild defects in  basal and mid lateral  walls that are fixed, suggestive of infarct. Spoke w/ Dr. Mathews who saw the pt earlier, states pt can be offered admission for non emergent cath. Discussed results with pt, agrees with plan for admission. All questions answered.

## 2021-07-18 NOTE — H&P ADULT - HISTORY OF PRESENT ILLNESS
66yoF w/ h/o HTN, GERD, gastroparesis, TIA x 2 (2007 & 2008), hypothyroid on synthroid, p/w 3 days of b/l leg swelling and pain. Pt has 3d of increasing b/l leg swelling L>R, associated with some mild nausea, dizziness ("feeling wobbly/off-balance"). Pain in b/l legs from the knees down to the feet, pressure in quality, constant, 6 out of 10 on pain scale, worse w/ movement, improved w/ rest. She also reports shortness of breath, but denies chest pain. Pt recently had colonoscopy 10d ago, as well as hardware removed from her R jaw during an outpt procedure w/local anesthetic last week. Pt was given a course of amoxicillin which she has completed.

## 2021-07-18 NOTE — H&P ADULT - NSHPLABSRESULTS_GEN_ALL_CORE
13.8   5.38  )-----------( 166      ( 17 Jul 2021 22:24 )             41.5   07-17    140  |  103  |  14  ----------------------------<  104<H>  4.4   |  25  |  0.82    Ca    9.8      17 Jul 2021 22:24    TPro  7.9  /  Alb  4.3  /  TBili  0.2  /  DBili  x   /  AST  33<H>  /  ALT  51<H>  /  AlkPhos  135<H>  07-17    LE dopplers:   No evidence of left lower extremity deep venous thrombosis.    EKG reviewed: sr, t wave abnl right precordial leads    TTE:  Normal left ventricular systolic function. No segmental  wall motion abnormalities.    Nuclear stress test:   There are small, mild defects in  basal and mid lateral  walls that are fixed, suggestive of infarct.  Post-stress gated wall motion analysis was performed  (LVEF = 61 %;LVEDV = 45 ml.)
Statement Selected

## 2021-07-18 NOTE — CONSULT NOTE ADULT - ASSESSMENT
67 y/o female with a PMhx HTN, HLD, Hypothyroidism presented to the ED complaining of having bilateral leg swelling over the past several days, with abnl ecg, dyspnea    #Dyspnea, atypical chest pain, abnl ecg  -no acs  -await echo and stress  -duplex negative for DVT    dispo per cdu pending echo/stress

## 2021-07-18 NOTE — H&P ADULT - NSHPPHYSICALEXAM_GEN_ALL_CORE
Vital Signs Last 24 Hrs  T(C): 36.7 (18 Jul 2021 14:04), Max: 37.1 (18 Jul 2021 07:01)  T(F): 98 (18 Jul 2021 14:04), Max: 98.7 (18 Jul 2021 07:01)  HR: 87 (18 Jul 2021 14:04) (58 - 101)  BP: 136/82 (18 Jul 2021 14:04) (116/76 - 158/95)  BP(mean): --  RR: 16 (18 Jul 2021 14:04) (16 - 18)  SpO2: 100% (18 Jul 2021 14:04) (96% - 100%)

## 2021-07-18 NOTE — H&P ADULT - PROBLEM SELECTOR PLAN 1
Abnormal stress test w/ fixed defects suggestive of infarct   Troponins negative   TTE w/ normal LV systolic function, no wall motion abnormalities   Plan for LHC as per cardiology  Continue ASA, Metoprolol, statin

## 2021-07-19 ENCOUNTER — TRANSCRIPTION ENCOUNTER (OUTPATIENT)
Age: 66
End: 2021-07-19

## 2021-07-19 VITALS
TEMPERATURE: 98 F | SYSTOLIC BLOOD PRESSURE: 120 MMHG | DIASTOLIC BLOOD PRESSURE: 79 MMHG | OXYGEN SATURATION: 100 % | RESPIRATION RATE: 18 BRPM | HEART RATE: 77 BPM

## 2021-07-19 LAB
ANION GAP SERPL CALC-SCNC: 11 MMOL/L — SIGNIFICANT CHANGE UP (ref 7–14)
BUN SERPL-MCNC: 11 MG/DL — SIGNIFICANT CHANGE UP (ref 7–23)
CALCIUM SERPL-MCNC: 9.3 MG/DL — SIGNIFICANT CHANGE UP (ref 8.4–10.5)
CHLORIDE SERPL-SCNC: 105 MMOL/L — SIGNIFICANT CHANGE UP (ref 98–107)
CO2 SERPL-SCNC: 24 MMOL/L — SIGNIFICANT CHANGE UP (ref 22–31)
COVID-19 SPIKE DOMAIN AB INTERP: POSITIVE
COVID-19 SPIKE DOMAIN ANTIBODY RESULT: >250 U/ML — HIGH
CREAT SERPL-MCNC: 0.76 MG/DL — SIGNIFICANT CHANGE UP (ref 0.5–1.3)
GLUCOSE SERPL-MCNC: 95 MG/DL — SIGNIFICANT CHANGE UP (ref 70–99)
HCT VFR BLD CALC: 44.2 % — SIGNIFICANT CHANGE UP (ref 34.5–45)
HGB BLD-MCNC: 14.7 G/DL — SIGNIFICANT CHANGE UP (ref 11.5–15.5)
MAGNESIUM SERPL-MCNC: 2 MG/DL — SIGNIFICANT CHANGE UP (ref 1.6–2.6)
MCHC RBC-ENTMCNC: 30 PG — SIGNIFICANT CHANGE UP (ref 27–34)
MCHC RBC-ENTMCNC: 33.3 GM/DL — SIGNIFICANT CHANGE UP (ref 32–36)
MCV RBC AUTO: 90.2 FL — SIGNIFICANT CHANGE UP (ref 80–100)
NRBC # BLD: 0 /100 WBCS — SIGNIFICANT CHANGE UP
NRBC # FLD: 0 K/UL — SIGNIFICANT CHANGE UP
PHOSPHATE SERPL-MCNC: 3.5 MG/DL — SIGNIFICANT CHANGE UP (ref 2.5–4.5)
PLATELET # BLD AUTO: 165 K/UL — SIGNIFICANT CHANGE UP (ref 150–400)
POTASSIUM SERPL-MCNC: 3.9 MMOL/L — SIGNIFICANT CHANGE UP (ref 3.5–5.3)
POTASSIUM SERPL-SCNC: 3.9 MMOL/L — SIGNIFICANT CHANGE UP (ref 3.5–5.3)
RBC # BLD: 4.9 M/UL — SIGNIFICANT CHANGE UP (ref 3.8–5.2)
RBC # FLD: 12.9 % — SIGNIFICANT CHANGE UP (ref 10.3–14.5)
SARS-COV-2 IGG+IGM SERPL QL IA: >250 U/ML — HIGH
SARS-COV-2 IGG+IGM SERPL QL IA: POSITIVE
SODIUM SERPL-SCNC: 140 MMOL/L — SIGNIFICANT CHANGE UP (ref 135–145)
WBC # BLD: 5.25 K/UL — SIGNIFICANT CHANGE UP (ref 3.8–10.5)
WBC # FLD AUTO: 5.25 K/UL — SIGNIFICANT CHANGE UP (ref 3.8–10.5)

## 2021-07-19 RX ADMIN — Medication 50 MICROGRAM(S): at 05:46

## 2021-07-19 RX ADMIN — Medication 81 MILLIGRAM(S): at 10:29

## 2021-07-19 NOTE — DISCHARGE NOTE PROVIDER - NSDCCPCAREPLAN_GEN_ALL_CORE_FT
Attending Attestation (For Attendings USE Only)... PRINCIPAL DISCHARGE DIAGNOSIS  Diagnosis: Abnormal stress test  Assessment and Plan of Treatment: Continue taking your Aspirin as prescibed. Monitor for any chest pain, tingling or numbness in the face and arms, and increased fatigue with exertion. You underwent an angiogram (cardiac catheterization) and no stents were placed. Follow up with Dr. Mathews in the office within 1 - 2 weeks for further management recommendations.

## 2021-07-19 NOTE — DISCHARGE NOTE PROVIDER - CARE PROVIDER_API CALL
Casper Mathews)  Cardiology  [address], [address]  [place] [address]  Phone: [phone]  Fax: [phone]  Established Patient  Follow Up Time: 1 week

## 2021-07-19 NOTE — DISCHARGE NOTE PROVIDER - NSDCFUADDINST_GEN_ALL_CORE_FT
No heavy lifting greater than 5-10 pounds with right wrist x one week. No strenuous activity x 3 weeks. Monitor site of procedure and notify your doctor for any redness, swelling, discharge

## 2021-07-19 NOTE — DISCHARGE NOTE PROVIDER - NSDCMRMEDTOKEN_GEN_ALL_CORE_FT
amLODIPine 5 mg oral tablet: 1 tab(s) orally once a day AM  aspirin 81 mg oral delayed release tablet: 1 tab(s) orally once a day  atorvastatin 20 mg oral tablet: 1 tab(s) orally once a day AM  culturelle: 1 tab(s) orally once a day AM  levothyroxine 50 mcg (0.05 mg) oral tablet: 1 tab(s) orally once a day AM  metoprolol: 50 milligram(s) orally once a day AM  PARoxetine 10 mg oral tablet: 1 tab(s) orally once a day AM  valsartan 320 mg oral tablet: 1 tab(s) orally once a day

## 2021-07-19 NOTE — DISCHARGE NOTE PROVIDER - HOSPITAL COURSE
66yoF w/ h/o HTN, GERD, gastroparesis, TIA x 2 (2007 & 2008), hypothyroid on synthroid, p/w 3 days of b/l leg swelling and pain, also w/ dyspnea. Pt w/ stress test showing infarct. Patient underwent cardiac cath on 7/19 without need for stent placement. Patient's symptoms improved and stable for discharge. On 7/19 this case was reviewed with Dr. Mathews, the patient is medically stable and optimized for discharge. All medications were reviewed and prescriptions were sent to mutually agreed upon pharmacy.

## 2021-07-19 NOTE — DISCHARGE NOTE NURSING/CASE MANAGEMENT/SOCIAL WORK - PATIENT PORTAL LINK FT
You can access the FollowMyHealth Patient Portal offered by [facility] by registering at the following website: [email or web address]. By joining [facility]’s FollowMyHealth portal, you will also be able to view your health information using other applications (apps) compatible with our system.

## 2021-07-26 ENCOUNTER — APPOINTMENT (OUTPATIENT)
Dept: CARE COORDINATION | Facility: HOME HEALTH | Age: 66
End: 2021-07-26
Payer: MEDICAID

## 2021-07-26 DIAGNOSIS — I10 ESSENTIAL (PRIMARY) HYPERTENSION: ICD-10-CM

## 2021-07-26 DIAGNOSIS — I21.3 ST ELEVATION (STEMI) MYOCARDIAL INFARCTION OF UNSPECIFIED SITE: ICD-10-CM

## 2021-07-26 DIAGNOSIS — K21.9 GASTRO-ESOPHAGEAL REFLUX DISEASE W/OUT ESOPHAGITIS: ICD-10-CM

## 2021-07-26 DIAGNOSIS — Z86.59 PERSONAL HISTORY OF OTHER MENTAL AND BEHAVIORAL DISORDERS: ICD-10-CM

## 2021-07-26 PROCEDURE — 99349 HOME/RES VST EST MOD MDM 40: CPT | Mod: 95

## 2021-07-29 PROBLEM — Z86.59 HISTORY OF ANXIETY: Status: ACTIVE | Noted: 2021-07-29

## 2021-07-29 PROBLEM — I21.3 STEMI (ST ELEVATION MYOCARDIAL INFARCTION): Status: ACTIVE | Noted: 2021-07-29

## 2021-07-29 PROBLEM — K21.9 LPRD (LARYNGOPHARYNGEAL REFLUX DISEASE): Status: ACTIVE | Noted: 2018-06-20

## 2021-07-29 RX ORDER — SULFAMETHOXAZOLE AND TRIMETHOPRIM 800; 160 MG/1; MG/1
800-160 TABLET ORAL
Qty: 6 | Refills: 0 | Status: DISCONTINUED | COMMUNITY
Start: 2020-05-04 | End: 2021-07-29

## 2021-07-29 RX ORDER — AZELASTINE HYDROCHLORIDE 137 UG/1
137 SPRAY, METERED NASAL TWICE DAILY
Qty: 1 | Refills: 0 | Status: DISCONTINUED | COMMUNITY
Start: 2018-06-20 | End: 2021-07-29

## 2021-07-29 RX ORDER — AMLODIPINE BESYLATE 5 MG/1
5 TABLET ORAL
Qty: 30 | Refills: 0 | Status: DISCONTINUED | COMMUNITY
Start: 2019-11-29 | End: 2021-07-29

## 2021-07-29 RX ORDER — CEPHALEXIN 500 MG/1
500 CAPSULE ORAL
Qty: 28 | Refills: 0 | Status: DISCONTINUED | COMMUNITY
Start: 2020-07-08 | End: 2021-07-29

## 2021-07-29 RX ORDER — CHOLECALCIFEROL (VITAMIN D3) 25 MCG
TABLET ORAL
Refills: 0 | Status: DISCONTINUED | COMMUNITY
End: 2021-07-29

## 2021-07-29 RX ORDER — FLUTICASONE PROPIONATE 50 UG/1
50 SPRAY, METERED NASAL TWICE DAILY
Qty: 1 | Refills: 0 | Status: DISCONTINUED | COMMUNITY
Start: 2019-05-29 | End: 2021-07-29

## 2021-07-29 RX ORDER — B-COMPLEX WITH VITAMIN C
TABLET ORAL DAILY
Refills: 0 | Status: ACTIVE | COMMUNITY
Start: 2021-07-29

## 2021-07-29 RX ORDER — CHLORHEXIDINE GLUCONATE, 0.12% ORAL RINSE 1.2 MG/ML
0.12 SOLUTION DENTAL
Qty: 473 | Refills: 0 | Status: DISCONTINUED | COMMUNITY
Start: 2019-08-02 | End: 2021-07-29

## 2021-07-29 RX ORDER — GABAPENTIN 300 MG/1
300 CAPSULE ORAL
Qty: 30 | Refills: 1 | Status: DISCONTINUED | COMMUNITY
Start: 2020-09-14 | End: 2021-07-29

## 2021-07-29 RX ORDER — NYSTATIN 100000 U/G
100000 OINTMENT TOPICAL
Qty: 15 | Refills: 0 | Status: DISCONTINUED | COMMUNITY
Start: 2020-08-24 | End: 2021-07-29

## 2021-07-29 RX ORDER — LACTOBACILLUS RHAMNOSUS GG 10B CELL
CAPSULE ORAL
Refills: 0 | Status: ACTIVE | COMMUNITY
Start: 2021-07-29

## 2021-07-29 RX ORDER — ECONAZOLE NITRATE 10 MG/G
1 CREAM TOPICAL
Qty: 30 | Refills: 0 | Status: DISCONTINUED | COMMUNITY
Start: 2019-07-17 | End: 2021-07-29

## 2021-07-29 RX ORDER — AZELASTINE HYDROCHLORIDE 137 UG/1
0.1 SPRAY, METERED NASAL TWICE DAILY
Qty: 1 | Refills: 0 | Status: DISCONTINUED | COMMUNITY
Start: 2021-04-08 | End: 2021-07-29

## 2021-07-29 RX ORDER — LEVOTHYROXINE SODIUM 0.05 MG/1
50 TABLET ORAL
Refills: 0 | Status: ACTIVE | COMMUNITY

## 2021-07-29 RX ORDER — SILVER SULFADIAZINE 10 MG/G
1 CREAM TOPICAL
Qty: 50 | Refills: 0 | Status: DISCONTINUED | COMMUNITY
Start: 2020-07-08 | End: 2021-07-29

## 2021-07-29 RX ORDER — MUPIROCIN 20 MG/G
2 OINTMENT TOPICAL
Qty: 1 | Refills: 0 | Status: DISCONTINUED | COMMUNITY
Start: 2020-07-09 | End: 2021-07-29

## 2021-07-29 RX ORDER — TRIAMCINOLONE ACETONIDE 1 MG/G
0.1 OINTMENT TOPICAL
Qty: 1 | Refills: 0 | Status: DISCONTINUED | COMMUNITY
Start: 2020-06-30 | End: 2021-07-29

## 2021-07-29 RX ORDER — FLUTICASONE PROPIONATE 50 UG/1
50 SPRAY, METERED NASAL
Qty: 1 | Refills: 2 | Status: DISCONTINUED | COMMUNITY
Start: 2018-06-20 | End: 2021-07-29

## 2021-07-29 NOTE — PHYSICAL EXAM
[Normal] : no acute distress, well nourished, well developed and well-appearing [No Respiratory Distress] : no respiratory distress  [No Accessory Muscle Use] : no accessory muscle use [No Edema] : there was no peripheral edema [Normal Affect] : the affect was normal [Alert and Oriented x3] : oriented to person, place, and time [Normal Mood] : the mood was normal [de-identified] : rt wrist CDI, no drainage, no redness noted

## 2021-07-29 NOTE — HISTORY OF PRESENT ILLNESS
[Home] : at home, [unfilled] , at the time of the visit. [Other Location: e.g. Home (Enter Location, City,State)___] : at [unfilled] [Verbal consent obtained from patient] : the patient, [unfilled] [FreeTextEntry1] : f/u hospitalization for STEMI [de-identified] : As per inpatient d/c note-66yoF w/ h/o HTN, GERD, gastroparesis, TIA x 2 (2007 & 2008), hypothyroid on synthroid, p/w 3 days of b/l leg swelling and pain, also w/ dyspnea. Pt w/ stress test showing infarct. Patient underwent cardiac cath on 7/19 without need for stent placement. Patient's symptoms improved and stable for discharge.\par 	\par 	STEMI/HTN: no CP, SOB, rt thumb/index finger discomfort at times (location of angiogram), denies numbness/tingling to extremity +fatigue at times, ADLs as barber.   Patient to see cardio next Tuesday.  Reports stopping amlodipine 2/2 reading s/e of le edema.  Patient reports taking BP reading average 115/75. Reports \par 	recent tooth implant sx in which ASA was held at that time and is now resumed.  \par 	\par 	GERD: gets s/s of belching at times, does not take medications, avoids acidic fruits, consumes a piece of bread at times which helps\par 	\par 	Anxiety: Reports recent stress d/t moving to [place] and has been undergoing stress. \par 	reports having stopped Paxil x 5yrs ago with PCP consultation as it wasn't needed.  Was originally on it 2/2 being menopausal. Reports using non pharmacological methods for anxiety-meditation, swimming, treadmill, spending time with family which has been helpful\par 	\par 	Health maint: UTD with COVID vaccine\par 	\par

## 2021-07-29 NOTE — REVIEW OF SYSTEMS
[Negative] : Respiratory [FreeTextEntry5] : see hpi [FreeTextEntry7] : see hpi [de-identified] : see hpi

## 2021-08-05 ENCOUNTER — APPOINTMENT (OUTPATIENT)
Dept: OTOLARYNGOLOGY | Facility: CLINIC | Age: 66
End: 2021-08-05
Payer: MEDICARE

## 2021-08-05 DIAGNOSIS — J34.89 OTHER SPECIFIED DISORDERS OF NOSE AND NASAL SINUSES: ICD-10-CM

## 2021-08-05 PROCEDURE — 99213 OFFICE O/P EST LOW 20 MIN: CPT | Mod: 25

## 2021-08-05 PROCEDURE — 31231 NASAL ENDOSCOPY DX: CPT

## 2021-08-05 NOTE — HISTORY OF PRESENT ILLNESS
[de-identified] : 66 year old female follow up for nasal obstruction\par s/p Septoplasty, BITR and ESS\par Recommended to continue with Saline rinses in conjunction with Azelastine and Fluticasone\par Maintain reflux precautions. No PSG yet. \par Off reflux meds, doing well with dietary changes\par breathing well\par

## 2021-08-05 NOTE — PHYSICAL EXAM
[Midline] : trachea located in midline position [Normal] : no rashes [Nasal Endoscopy Performed] : nasal endoscopy was performed, see procedure section for findings [de-identified] : clear voice

## 2021-08-05 NOTE — REASON FOR VISIT
[Subsequent Evaluation] : a subsequent evaluation for [FreeTextEntry2] : follow up for nasal obstruction

## 2021-08-05 NOTE — CONSULT LETTER
[Dear  ___] : Dear  [unfilled], [Consult Letter:] : I had the pleasure of evaluating your patient, [unfilled]. [Please see my note below.] : Please see my note below. [Consult Closing:] : Thank you very much for allowing me to participate in the care of this patient.  If you have any questions, please do not hesitate to contact me. [Sincerely,] : Sincerely, [FreeTextEntry3] : Bishop Davis MD, PhD\par Chief, Division of Laryngology\par Department of Otolaryngology\par [facility]\par Pediatric Otolaryngology, [facility]\par  of Otolaryngology\par [employer] [facility] [employer]\par

## 2022-07-19 PROBLEM — J31.0 CHRONIC RHINOSINUSITIS: Status: ACTIVE | Noted: 2018-06-20

## 2023-02-21 NOTE — ED PROVIDER NOTE - IV ALTEPLASE INCLUSION HIDDEN
show Tissue Cultured Epidermal Autograft Text: The defect edges were debeveled with a #15 scalpel blade.  Given the location of the defect, shape of the defect and the proximity to free margins a tissue cultured epidermal autograft was deemed most appropriate.  The graft was then trimmed to fit the size of the defect.  The graft was then placed in the primary defect and oriented appropriately.

## 2023-11-26 NOTE — H&P ADULT - NSICDXPASTSURGICALHX_GEN_ALL_CORE_FT
PAST SURGICAL HISTORY:  H/O abdominoplasty     H/O breast augmentation     S/P appendectomy 11/2015    S/P partial hysterectomy     
11

## 2024-02-12 NOTE — H&P ADULT - RS GEN HX ROS MEA POS PC
Pt wanted a quiet room to talk in for her med rec, there is no space to meet her request. She gave me a number for her sister who helps with meds ([phone]). No one picked up. Called [phone], another sister picked up but gave me moms number which was . Mom did not . Pharmacy can try these numbers again later or try talking with patient as she is knowledgeable about her meds.    dyspnea

## 2024-03-13 NOTE — H&P PST ADULT - CONSTITUTIONAL
Pt remains elevated, yelling and disruptive. 1X 2mg Oral ativan X1 given at this time pt took meds cooperatively.    detailed exam

## 2024-09-12 NOTE — ED ADULT NURSE NOTE - IS THE PATIENT ABLE TO BE SCREENED?
Yes [General Appearance - Well-Appearing] : Well appearing [General Appearance - Well Nourished] : well nourished [Oriented To Time, Place, And Person] : Oriented to person, place, and time [Sclera] : the sclera and conjunctiva were normal [Neck Cervical Mass (___cm)] : no neck mass was observed [Heart Rate And Rhythm] : heart rate was normal and rhythm regular [] : No respiratory distress [Abdomen Soft] : Soft [Normal Station and Gait] : gait and station were normal [FreeTextEntry1] : On exam the patient stands in good balance. There is a 5 cm well-defined mass at appears in the subcutaneous fat. It is mobile and rubbery. No Tinel's but there is tenderness to deep palpation. No thrills or bruits. She has full ROM of her hip. She has no lateral trochanteric pain and no SI pain.

## 2024-09-20 NOTE — ASU PREOP CHECKLIST - PATIENT'S PERSONAL PROPERTY GIVEN TO
YANDY met with pt per pt request. Pt wanted to thank YANDY for $1,000 juana funds received [facility], stating it was such a big help. Pt stated that she's still waiting to hear from Moni Miller and [employer] applications sent earlier this month. Pt informed that she will receive correspondence via mail from the above. No other needs expressed. SW will remain available.      Locker #   15/on unit

## 2024-11-11 NOTE — ASU PATIENT PROFILE, ADULT - CAREGIVER
Vy Bell, RN  You18 minutes ago (1:09 PM)     Hi, Ok per hold per Dr. Pate.    Aspirin would defer to ordering provider.  Thanks!       Response sent to  via prep for case   No

## 2025-03-10 NOTE — ASU PATIENT PROFILE, ADULT - NS PRO LAST MENSTRUAL
not applicable